# Patient Record
Sex: MALE | Race: BLACK OR AFRICAN AMERICAN | Employment: UNEMPLOYED | URBAN - METROPOLITAN AREA
[De-identification: names, ages, dates, MRNs, and addresses within clinical notes are randomized per-mention and may not be internally consistent; named-entity substitution may affect disease eponyms.]

---

## 2019-03-21 ENCOUNTER — OFFICE VISIT (OUTPATIENT)
Dept: ORTHOPEDIC SURGERY | Age: 54
End: 2019-03-21

## 2019-03-21 VITALS
DIASTOLIC BLOOD PRESSURE: 90 MMHG | TEMPERATURE: 97.7 F | RESPIRATION RATE: 16 BRPM | OXYGEN SATURATION: 97 % | WEIGHT: 250.6 LBS | BODY MASS INDEX: 33.21 KG/M2 | SYSTOLIC BLOOD PRESSURE: 167 MMHG | HEART RATE: 66 BPM | HEIGHT: 73 IN

## 2019-03-21 DIAGNOSIS — M79.18 CERVICAL MYOFASCIAL PAIN SYNDROME: ICD-10-CM

## 2019-03-21 DIAGNOSIS — M54.2 CERVICAL PAIN: ICD-10-CM

## 2019-03-21 DIAGNOSIS — M54.59 MECHANICAL LOW BACK PAIN: ICD-10-CM

## 2019-03-21 DIAGNOSIS — M54.50 LUMBAR SPINE PAIN: Primary | ICD-10-CM

## 2019-03-21 DIAGNOSIS — M79.18 MYOFASCIAL PAIN: ICD-10-CM

## 2019-03-21 NOTE — PROGRESS NOTES
[address]  [address], [address]  [place], [facility]  Phone: [phone]  Fax: [phone]        Jason Clark  : 1965  PCP: Naida Hogan DO  3/21/2019    NEW PATIENT      HISTORY OF PRESENT ILLNESS  Cuauhtemoc Montemayor is a 47 y.o. male c/o chronic neck and low back pain x 2+ years. He reports that he had cervical interlaminar injections in the past in [place] that did not provide any improvement of his pain. He believes he had trigger point injections in the past that provided some relief for his lower back pain. He has trigger fingers in his left hand - he is scheduled to have this evaluated next week. He has h/o DM. He has found some improvement of his low back pain with wearing a girdle for support. He works as a . He believes that he has had a lumbar MRI in the past, but does not have the images or reports with him today. He rates his pain as an 8/10 today. ASSESSMENT  His pain is likely myofascial in nature given his tenderness to palpation of his cervical and lumbar regions. PLAN  1. I advised a Theracane may be beneficial.  2. Thoroughly advised on maintaining an HEP and proper biomechanics. 3. Referred to PT with dry needling. Pt will f/u in 6 weeks or sooner if needed. Diagnoses and all orders for this visit:    1. Lumbar spine pain  -     AMB POC XRAY, SPINE, LUMBOSACRAL; 2 O  -     REFERRAL TO PHYSICAL THERAPY    2. Cervical pain  -     AMB POC XRAY, SPINE, CERVICAL; 2 OR 3  -     REFERRAL TO PHYSICAL THERAPY    3. Mechanical low back pain  -     REFERRAL TO PHYSICAL THERAPY    4. Myofascial pain  -     REFERRAL TO PHYSICAL THERAPY    5. Cervical myofascial pain syndrome  -     REFERRAL TO PHYSICAL THERAPY       CHIEF COMPLAINT  Cuauhtemoc Montemayor is seen today in consultation at the request of Naida Hogan DO for complaints of chronic neck and low back pain.        PAST MEDICAL HISTORY   Past Medical History: Diagnosis Date    Diabetes ([facility] [address]     Diabetes mellitus     Difficulty voiding     Essential hypertension     Hypertension        No past surgical history on file. MEDICATIONS    Current Outpatient Medications   Medication Sig Dispense Refill    metFORMIN (GLUCOPHAGE) 500 mg tablet Take  by mouth two (2) times daily (with meals).  insulin NPH/insulin regular (HUMULIN 70/30 U-100 INSULIN) 100 unit/mL (70-30) injection by SubCUTAneous route.  tamsulosin (FLOMAX) 0.4 mg capsule Take 0.4 mg by mouth daily.  losartan (COZAAR) 100 mg tablet Take 100 mg by mouth daily.  sildenafil, antihypertensive, (REVATIO) 20 mg tablet Take five tablets by mouth one hour prior to sexual activity. 50 Tab 11    LOSARTAN PO Take  by mouth.  amLODIPine (NORVASC) 5 mg tablet Take 5 mg by mouth daily.  DUTASTERIDE (AVODART PO) Take  by mouth.  LINAGLIPTIN/METFORMIN HCL (JENTADUETO PO) Take  by mouth.  tamsulosin (FLOMAX) 0.4 mg capsule Take 1 Cap by mouth daily. 90 Cap 3       ALLERGIES  No Known Allergies       SOCIAL HISTORY    Social History     Socioeconomic History    Marital status:      Spouse name: Not on file    Number of children: Not on file    Years of education: Not on file    Highest education level: Not on file   Tobacco Use    Smoking status: Never Smoker    Smokeless tobacco: Never Used   Substance and Sexual Activity    Alcohol use: Yes   Social History Narrative    ** Merged History Encounter **            FAMILY HISTORY  Family History   Problem Relation Age of Onset    Hypertension Mother     Diabetes Father     Diabetes Mother     Hypertension Father     Diabetes Brother     Hypertension Brother          REVIEW OF SYSTEMS  Review of Systems   Musculoskeletal: Positive for back pain and neck pain.          PHYSICAL EXAMINATION  Visit Vitals  /90   Pulse 66   Temp 97.7 °F (36.5 °C)   Resp 16   Ht 6' 1\" (1.854 m)   Wt 250 lb 9.6 oz (113.7 kg)   SpO2 97%   BMI 33.06 kg/m²         No flowsheet data found. Constitutional:  Well developed, well nourished, in no acute distress. Psychiatric: Affect and mood are appropriate. HEENT: Normocephalic, atraumatic. Extraocular movements intact. Integumentary: No rashes or abrasions noted on exposed areas. Cardiovascular: Regular rate and rhythm. Pulmonary: Clear to auscultation bilaterally. SPINE/MUSCULOSKELETAL EXAM    Cervical spine:  Neck is midline. Normal muscle tone. No focal atrophy is noted. ROM pain free. Shoulder ROM intact. Tenderness to palpation of trapezii and cervical paraspinals. Negative Spurling's sign. Negative Tinel's sign. Negative Krishnamurthy's sign. Sensation in the bilateral arms grossly intact to light touch. Lumbar spine:  No rash, ecchymosis, or gross obliquity. No fasciculations. No focal atrophy is noted. No pain with hip ROM. Full range of motion. Tenderness to palpation of lumbar paraspinals. No tenderness to palpation at the sciatic notch. SI joints non-tender. Trochanters non tender. Sensation in the bilateral legs grossly intact to light touch. MOTOR:      Biceps  Triceps Deltoids Wrist Ext Wrist Flex Hand Intrin   Right 5/5 5/5 5/5 5/5 5/5 5/5   Left 5/5 5/5 5/5 5/5 5/5 5/5             Hip Flex  Quads Hamstrings Ankle DF EHL Ankle PF   Right 5/5 5/5 5/5 5/5 5/5 5/5   Left 5/5 5/5 5/5 5/5 5/5 5/5     DTRs are 1+ biceps, triceps, brachioradialis, patella, and Achilles. Negative Straight Leg raise. Squat not tested. No difficulty with tandem gait. Ambulation without assistive device. FWB. RADIOGRAPHS  Cervical XR images taken on 3/21/19 personally reviewed with patient:  Lean to the right. Facet sclerosis  Anterior endplate osteophytes  Disc space narrowing at C5-6 and C6-7    Lumbar XR images taken on 3/21/19 personally reviewed with patient:  Normal alignment.   No significant facet sclerosis,  Slight lean to the L  No obvious compression fractures or instabilities. Lumbar XR images taken on 5/16/18 personally reviewed with patient:  Findings: Minimal dextrocurvature. Alignment is otherwise normal. No listhesis elicited with flexion/extension. Vertebral body height is preserved. Mild multilevel degenerative disc changes. Facets appear normal. The regional soft tissues are unremarkable. IMPRESSION  IMPRESSION:     Mild degenerative disc changes. No evidence of instability. IMPRESSION:     Mild degenerative disc changes. No evidence of instability.  reviewed    Mr. Tita Cowan has a reminder for a \"due or due soon\" health maintenance. I have asked that he contact his primary care provider for follow-up on this health maintenance. 21 minutes of face-to-face contact were spent with the patient during today's visit extensively discussing symptoms and treatment plan. All questions were answered. More than half of this visit today was spent on counseling. Written by Mari Nieto, as dictated by Dr. Latha Salas. I, Dr. Latha Salas, confirm that all documentation is accurate.

## 2019-03-21 NOTE — PATIENT INSTRUCTIONS
Back Stretches: Exercises  Your Care Instructions  Here are some examples of exercises for stretching your back. Start each exercise slowly. Ease off the exercise if you start to have pain. Your doctor or physical therapist will tell you when you can start these exercises and which ones will work best for you. How to do the exercises  Overhead stretch    1. Stand comfortably with your feet shoulder-width apart. 2. Looking straight ahead, raise both arms over your head and reach toward the ceiling. Do not allow your head to tilt back. 3. Hold for 15 to 30 seconds, then lower your arms to your sides. 4. Repeat 2 to 4 times. Side stretch    1. Stand comfortably with your feet shoulder-width apart. 2. Raise one arm over your head, and then lean to the other side. 3. Slide your hand down your leg as you let the weight of your arm gently stretch your side muscles. Hold for 15 to 30 seconds. 4. Repeat 2 to 4 times on each side. Press-up    1. Lie on your stomach, supporting your body with your forearms. 2. Press your elbows down into the floor to raise your upper back. As you do this, relax your stomach muscles and allow your back to arch without using your back muscles. As your press up, do not let your hips or pelvis come off the floor. 3. Hold for 15 to 30 seconds, then relax. 4. Repeat 2 to 4 times. Relax and rest    1. Lie on your back with a rolled towel under your neck and a pillow under your knees. Extend your arms comfortably to your sides. 2. Relax and breathe normally. 3. Remain in this position for about 10 minutes. 4. If you can, do this 2 or 3 times each day. Follow-up care is a key part of your treatment and safety. Be sure to make and go to all appointments, and call your doctor if you are having problems. It's also a good idea to know your test results and keep a list of the medicines you take. Where can you learn more?   Go to http://inessa-jayda.info/. Enter R864 in the search box to learn more about \"Back Stretches: Exercises. \"  Current as of: September 20, 2018  Content Version: 11.9  © 3683-5503 [employer]. Care instructions adapted under license by [employer] (which disclaims liability or warranty for this information). If you have questions about a medical condition or this instruction, always ask your healthcare professional. [address] any warranty or liability for your use of this information. Therapeutic Ball: Back Exercises  Your Care Instructions  Here are some examples of typical exercises for your condition. Start each exercise slowly. Ease off the exercise if you start to have pain. Your doctor or physical therapist will tell you when you can start these exercises and which ones will work best for you. To prepare, make sure that your ball is the right size for you. When inflated and firm, it should allow you to sit with your hips and knees bent at about a 90-degree angle (like the letter L). How to do the exercises  Seated position on ball    1. Use this exercise to get used to moving on the ball and to find your best sitting position. 2. Sit comfortably on the ball with your feet about hip-width apart. If you feel unsteady, rest your hands on the ball near your hips. 3. As you do this exercise, try to keep your shoulders and upper body relaxed and still. 4. Using your stomach and back muscles to move your pelvis, roll the ball forward. This will round your back. 5. Still using your stomach and back muscles, roll the ball back. You will arch your back. 6. Repeat this rounding-arching motion a few times. 7. Stop in between the two positions, where your back is not rounded or arched. This is called your neutral position. Pelvic rotation    1. Sit tall on the ball. 2. Slowly rotate your hips in a [place] pattern.  Keep the movement focused at your hips. 3. Repeat, but [place] in the other direction. 4. Repeat 8 to 12 times. Postural sitting    1. Use this position to find a stable, relaxed posture on the ball. You can use this position as your starting point for other ball exercises. If you feel unsteady on the ball, start on a chair first.  2. Sit on a ball or chair, with your feet planted straight in front of you. 3. Imagine that a string at the top of your head is pulling you straight up. Think of yourself as 2 inches taller than you are.  4. Slightly tuck your chin. 5. Keep your shoulders back and relaxed. Knee extension    1. Sit tall on the ball with your feet planted in front of you, hip-width apart. As you do this exercise, avoid slumping your shoulders and arching your back. 2. Rest your hands on the ball near your hip or a steady object next to you. (If you feel very stable on the ball, rest your hands in your lap or at your side.)  3. Slowly straighten one leg at the knee. Slowly lower it back down. Repeat with the other leg. 4. Repeat this exercise 8 to 12 times. Roll-ups    1. Lie on your back with your knees bent, feet resting on the floor. 2. Lay the ball on your thighs. Rest your hands up high on the ball. 3. Raising your head and shoulder blades, roll the ball up your thighs. Exhale as you roll up. 4. If this is hard on your neck, gently support your lower head and upper neck with one hand. Don't use that hand to pull your head up. 5. Repeat 8 to 12 times. Ball curls    1. Lie on your back with your ankles resting on the ball, knees straight. 2. Use your legs to roll the exercise ball toward you. Allow your knees to bend and move closer to your chest.  3. Pause briefly, and then roll the ball to the starting position. Try to keep the ball rolling straight. You will feel the muscles in your lower belly working. 4. Repeat 8 to 12 times. Bridge with ball under legs    1.  Lie on your back with your legs up, calves resting on the ball. For more challenge, rest your heels on the ball. 2. Look up at the ceiling, and keep your chin relaxed. You can place a small pillow under your head or neck for comfort. 3. With your arms by your side, press your hands onto the floor for stability. 4. Tighten your belly muscles by pulling in your belly button toward your spine. 5. Push your heels down toward the floor, squeeze your buttocks, and lift your hips off the floor until your shoulders, hips, and knees are all in a straight line. 6. Try to keep the ball steady. Hold for about 6 seconds as you continue to breathe normally. 7. Slowly lower your hips back down to the floor. 8. Repeat 8 to 12 times. Ball curls with bridge    1. Start flat on your back with your ankles resting on the ball. 2. Look up at the ceiling, and keep your chin relaxed. You can place a small pillow under your head or neck for comfort. 3. With your arms by your side, press your hands onto the floor for stability. 4. Tighten your belly muscles by pulling in your belly button toward your spine. 5. Push your heels down toward the floor, squeeze your buttocks, and lift your hips off the floor until your shoulders, hips, and knees are all in a straight line. 6. While holding the bridge position, roll the ball toward you with your heels. Keep your hips as level as you can. 7. Pause briefly, and then roll the ball back out. Try to keep the ball rolling straight. You will feel the muscles in your lower belly working as you straighten your legs. 8. Lower your hips, and return to your starting position. 9. Repeat 8 to 12 times. 10. When you can keep your body and the ball steady throughout this exercise, you're ready for more challenge. Try keeping your hips raised while rolling the ball out, holding the bridge, and rolling back, a few times in a row. Praying chino    1. Kneel upright with the ball in front of you.   2. To start, clasp your hands together. Rest them on the ball in front of you. 3. As you do this exercise, keep your back and hips straight and tighten your belly and buttocks muscles. Keep your knees in place. 4. Press on the ball with your arms. Lean forward from the knees. This rolls the ball forward. You will bear most of your weight on your arms. 5. If your back starts to ache, you've gone too far. Pull back a bit. 6. Roll back to the start position. 7. Repeat 8 to 12 times. Walk-out plank on ball    1. Kneel over the ball. Place your hands on the floor in front of you. 2. Walk your hands forward until your legs are straight on the ball. This is the plank position. 3. When in plank position, hold your body straight and tighten your belly and buttocks muscles. Keep your chin slightly tucked. 4. Roll as far forward as you can without losing your balance or letting your hips drop. You may stop with the ball under your thighs, or even under your knees or shins. 5. Hold a few seconds, then walk your hands back and return to the start position. 6. Repeat 8 to 12 times. Push-up with thighs on ball    1. Kneel over the ball. Place your hands on the floor in front of you. 2. Walk your hands forward until your legs are straight on the ball. This is the plank position. 3. When in plank position, hold your body straight and tighten your belly and buttocks muscles. Keep your chin slightly tucked. 4. Roll as far forward as you can without losing your balance or letting your hips drop. You may stop with the ball under your thighs, or even under your knees or shins. 5. Bend your elbows. Slowly lower your body toward the ground as far as you can without losing your balance. 6. If your wrists hurt, try moving your hands a little farther apart so they're not right under your shoulders. 7. Slowly straighten your arms. 8. Do 8 to 12 of these push-ups. Wall squat with ball    1. Stand facing away from a wall.  Place your feet about shoulder-width apart. 2. Place the ball between your middle back and the wall. Move your feet out in front of you so they are about a foot in front of your hips. 3. Keep your arms at your sides, or put your hands on your hips. 4. Slowly squat down as if you are going to sit in a chair, rolling your back over the ball as you squat. The ball should move with you but stay pressed into the wall. 5. Be sure that your knees do not go in front of your toes as you squat. 6. Hold for 6 seconds. 7. Slowly rise to your standing position. 8. Repeat 8 to 12 times. Child's pose with ball    1. Kneeling upright with your back straight, rest your hands on the ball in front of you. 2. Breathe out as you bend at the hips, and roll the ball forward. Lower your chest toward the ground, and drop your hips back toward your heels. 3. To stretch your upper back and shoulders, hold this position for 15 to 30 seconds. 4. Repeat 2 to 4 times. Follow-up care is a key part of your treatment and safety. Be sure to make and go to all appointments, and call your doctor if you are having problems. It's also a good idea to know your test results and keep a list of the medicines you take. Where can you learn more? Go to http://inessa-jayda.info/. Enter G795 in the search box to learn more about \"Therapeutic Ball: Back Exercises. \"  Current as of: September 20, 2018  Content Version: 11.9  © 0855-6886 [employer]. Care instructions adapted under license by [employer] (which disclaims liability or warranty for this information). If you have questions about a medical condition or this instruction, always ask your healthcare professional. [address] any warranty or liability for your use of this information. Low Back Pain: Exercises  Your Care Instructions  Here are some examples of typical rehabilitation exercises for your condition. Start each exercise slowly. Ease off the exercise if you start to have pain. Your doctor or physical therapist will tell you when you can start these exercises and which ones will work best for you. How to do the exercises  Press-up    1. Lie on your stomach, supporting your body with your forearms. 2. Press your elbows down into the floor to raise your upper back. As you do this, relax your stomach muscles and allow your back to arch without using your back muscles. As your press up, do not let your hips or pelvis come off the floor. 3. Hold for 15 to 30 seconds, then relax. 4. Repeat 2 to 4 times. Alternate arm and leg (bird dog) exercise    1. Start on the floor, on your hands and knees. 2. Tighten your belly muscles. 3. Raise one leg off the floor, and hold it straight out behind you. Be careful not to let your hip drop down, because that will twist your trunk. 4. Hold for about 6 seconds, then lower your leg and switch to the other leg. 5. Repeat 8 to 12 times on each leg. 6. Over time, work up to holding for 10 to 30 seconds each time. 7. If you feel stable and secure with your leg raised, try raising the opposite arm straight out in front of you at the same time. Knee-to-chest exercise    1. Lie on your back with your knees bent and your feet flat on the floor. 2. Bring one knee to your chest, keeping the other foot flat on the floor (or keeping the other leg straight, whichever feels better on your lower back). 3. Keep your lower back pressed to the floor. Hold for at least 15 to 30 seconds. 4. Relax, and lower the knee to the starting position. 5. Repeat with the other leg. Repeat 2 to 4 times with each leg. 6. To get more stretch, put your other leg flat on the floor while pulling your knee to your chest.    Curl-ups    1. Lie on the floor on your back with your knees bent at a 90-degree angle. Your feet should be flat on the floor, about 12 inches from your buttocks.   2. Cross your arms over your chest. If this bothers your neck, try putting your hands behind your neck (not your head), with your elbows spread apart. 3. Slowly tighten your belly muscles and raise your shoulder blades off the floor. 4. Keep your head in line with your body, and do not press your chin to your chest.  5. Hold this position for 1 or 2 seconds, then slowly lower yourself back down to the floor. 6. Repeat 8 to 12 times. Pelvic tilt exercise    1. Lie on your back with your knees bent. 2. \"Brace\" your stomach. This means to tighten your muscles by pulling in and imagining your belly button moving toward your spine. You should feel like your back is pressing to the floor and your hips and pelvis are rocking back. 3. Hold for about 6 seconds while you breathe smoothly. 4. Repeat 8 to 12 times. Heel dig bridging    1. Lie on your back with both knees bent and your ankles bent so that only your heels are digging into the floor. Your knees should be bent about 90 degrees. 2. Then push your heels into the floor, squeeze your buttocks, and lift your hips off the floor until your shoulders, hips, and knees are all in a straight line. 3. Hold for about 6 seconds as you continue to breathe normally, and then slowly lower your hips back down to the floor and rest for up to 10 seconds. 4. Do 8 to 12 repetitions. Hamstring stretch in doorway    1. Lie on your back in a doorway, with one leg through the open door. 2. Slide your leg up the wall to straighten your knee. You should feel a gentle stretch down the back of your leg. 3. Hold the stretch for at least 15 to 30 seconds. Do not arch your back, point your toes, or bend either knee. Keep one heel touching the floor and the other heel touching the wall. 4. Repeat with your other leg. 5. Do 2 to 4 times for each leg. Hip flexor stretch    1. Kneel on the floor with one knee bent and one leg behind you. Place your forward knee over your foot.  Keep your other knee touching the floor.  2. Slowly push your hips forward until you feel a stretch in the upper thigh of your rear leg. 3. Hold the stretch for at least 15 to 30 seconds. Repeat with your other leg. 4. Do 2 to 4 times on each side. Wall sit    1. Stand with your back 10 to 12 inches away from a wall. 2. Lean into the wall until your back is flat against it. 3. Slowly slide down until your knees are slightly bent, pressing your lower back into the wall. 4. Hold for about 6 seconds, then slide back up the wall. 5. Repeat 8 to 12 times. Follow-up care is a key part of your treatment and safety. Be sure to make and go to all appointments, and call your doctor if you are having problems. It's also a good idea to know your test results and keep a list of the medicines you take. Where can you learn more? Go to http://inessa-jayda.info/. Enter W901 in the search box to learn more about \"Low Back Pain: Exercises. \"  Current as of: September 20, 2018  Content Version: 11.9  © 5494-3391 [employer]. Care instructions adapted under license by [employer] (which disclaims liability or warranty for this information). If you have questions about a medical condition or this instruction, always ask your healthcare professional. [address] any warranty or liability for your use of this information. Shoulder Blade: Exercises  Your Care Instructions  Here are some examples of typical exercises for your condition. Start each exercise slowly. Ease off the exercise if you start to have pain. Your doctor or physical therapist will tell you when you can start these exercises and which ones will work best for you. How to do the exercises  Shoulder roll    1. Stand tall with your chin slightly tucked. Imagine that a string at the top of your head is pulling you straight up. 2. Keep your arms relaxed. All motion will be in your shoulders.   3. Shrug your shoulders up toward your ears, then up and back. [place] your shoulders down and back, like you're sliding your hands down into your back pants pockets. 4. Repeat the circles at least 2 to 4 times. 5. This exercise is also helpful anytime you want to relax. Lower neck and upper back stretch    1. With your arms about shoulder height, clasp your hands in front of you. 2. Drop your chin toward your chest.  3. Reach straight forward so you are rounding your upper back. Think about pulling your shoulder blades apart. [place] feel a stretch across your upper back and shoulders. Hold for at least 6 seconds. 4. Repeat 2 to 4 times. Triceps stretch    1. Reach your arm straight up. 2. Keeping your elbow in place, bend your arm and reach your hand down behind your back. 3. With your other hand, apply gentle pressure to the bent elbow. [place] feel a stretch at the back of your upper arm and shoulder. Hold about 6 seconds. 4. Repeat 2 to 4 times with each arm. Shoulder stretch    1. Relax your shoulders. 2. Raise one arm to shoulder height, and reach it across your chest.  3. Pull the arm slightly toward you with your other arm. This will help you get a gentle stretch. Hold for about 6 seconds. 4. Repeat 2 to 4 times. Shoulder blade squeeze    1. Sit or stand up tall with your arms at your sides. 2. Keep your shoulders relaxed and down, not shrugged. 3. Squeeze your shoulder blades together. Hold for 6 seconds, then relax. 4. Repeat 8 to 12 times. Straight-arm shoulder blade squeeze    1. Sit or stand tall. Relax your shoulders. 2. With palms down, hold your elastic tubing or band straight out in front of you. 3. Start with slight tension in the tubing or band, with your hands about shoulder-width apart. 4. Slowly pull straight out to the sides, squeezing your shoulder blades together. Keep your arms straight and at shoulder height. Slowly release. 5. Repeat 8 to 12 times. Rowing    1.  [place] your elastic tubing or band at about waist height. Take one end in each hand. 2. Sit or stand with your feet hip-width apart. 3. Hold your arms straight in front of you. Adjust your distance to create slight tension in the tubing or band. 4. Slightly tuck your chin. Relax your shoulders. 5. Without shrugging your shoulders, pull straight back. Your elbows will pass alongside your waist.    Pull-downs    1. [place] your elastic tubing or band in the top of a closed door. Take one end in each hand. 2. Either sit or stand, depending on what is more comfortable. If you feel unsteady, sit on a chair. 3. Start with your arms up and comfortably apart, elbows straight. There should be a slight tension in the tubing or band. 4. Slightly tuck your chin, and look straight ahead. 5. Keeping your back straight, slowly pull down and back, bending your elbows. 6. Stop where your hands are level with your chin, in a \"goalpost\" position. 7. Repeat 8 to 12 times. Chest T stretch    1. Lie on your back. Raise your knees so they are bent. Plant your feet on the floor, hip-width apart. 2. Tuck your chin, and relax your shoulders. 3. Reach your arms straight out to the sides. If you don't feel a mild stretch in your shoulders and across your chest, use a foam roll or a tightly rolled blanket under your spine, from your tailbone to your head. 4. Relax in this position for at least 15 to 30 seconds while you breathe normally. Repeat 2 to 4 times. 5. As you get used to this stretch, keep adding a little more time until you are able relax in this position for 2 or 3 minutes. When you can relax for at least 2 minutes, you only need to do the exercise 1 time per session. Chest goalpost stretch    1. Lie on your back. Raise your knees so they are bent. Plant your feet on the floor, hip-width apart. 2. Tuck your chin, and relax your shoulders. 3. Reach your arms straight out to the sides.   4. Bend your arms at the elbows, with your hands pointed toward the top of your head. Your arms should make an L on either side of your head. Your palms should be facing up. 5. If you don't feel a mild stretch in your shoulders and across your chest, use a foam roll or tightly rolled blanket under your spine, from your tailbone to your head. 6. Relax in this position for at least 15 to 30 seconds while you breathe normally. Repeat 2 to 4 times. 7. Each day you do this exercise, add a little more time until you can relax in this position for 2 or 3 minutes. When you can relax for at least 2 minutes, you only need to do the exercise 1 time per session. Follow-up care is a key part of your treatment and safety. Be sure to make and go to all appointments, and call your doctor if you are having problems. It's also a good idea to know your test results and keep a list of the medicines you take. Where can you learn more? Go to http://inessa-jayda.info/. Enter ([phone] 7357 0931 in the search box to learn more about \"Shoulder Blade: Exercises. \"  Current as of: September 20, 2018  Content Version: 11.9  © 7575-2057 [employer]. Care instructions adapted under license by [employer] (which disclaims liability or warranty for this information). If you have questions about a medical condition or this instruction, always ask your healthcare professional. [place] [address] any warranty or liability for your use of this information. Neck: Exercises  Your Care Instructions  Here are some examples of typical rehabilitation exercises for your condition. Start each exercise slowly. Ease off the exercise if you start to have pain. Your doctor or physical therapist will tell you when you can start these exercises and which ones will work best for you. How to do the exercises  Neck stretch    5. This stretch works best if you keep your shoulder down as you lean away from it.  To help you remember to do this, start by relaxing your shoulders and lightly holding on to your thighs or your chair. 6. Tilt your head toward your shoulder and hold for 15 to 30 seconds. Let the weight of your head stretch your muscles. 7. If you would like a little added stretch, use your hand to gently and steadily pull your head toward your shoulder. For example, keeping your right shoulder down, lean your head to the left. 8. Repeat 2 to 4 times toward each shoulder. Diagonal neck stretch    5. Turn your head slightly toward the direction you will be stretching, and tilt your head diagonally toward your chest and hold for 15 to 30 seconds. 6. If you would like a little added stretch, use your hand to gently and steadily pull your head forward on the diagonal.  7. Repeat 2 to 4 times toward each side. Dorsal glide stretch    5. Sit or stand tall and look straight ahead. 6. Slowly tuck your chin as you glide your head backward over your body  7. Hold for a count of 6, and then relax for up to 10 seconds. 8. Repeat 8 to 12 times. Chest and shoulder stretch    5. Sit or stand tall and glide your head backward as in the dorsal glide stretch. 6. Raise both arms so that your hands are next to your ears. 7. Take a deep breath, and as you breathe out, lower your elbows down and behind your back. You will feel your shoulder blades slide down and together, and at the same time you will feel a stretch across your chest and the front of your shoulders. 8. Hold for about 6 seconds, and then relax for up to 10 seconds. 9. Repeat 8 to 12 times. Strengthening: Hands on head    1. Move your head backward, forward, and side to side against gentle pressure from your hands, holding each position for about 6 seconds. 2. Repeat 8 to 12 times. Follow-up care is a key part of your treatment and safety. Be sure to make and go to all appointments, and call your doctor if you are having problems.  It's also a good idea to know your test results and keep a list of the medicines you take. Where can you learn more? Go to http://inessa-jayda.info/. Enter P975 in the search box to learn more about \"Neck: Exercises. \"  Current as of: September 20, 2018  Content Version: 11.9  © 4234-8144 [employer]. Care instructions adapted under license by [employer] (which disclaims liability or warranty for this information). If you have questions about a medical condition or this instruction, always ask your healthcare professional. [place] [address] any warranty or liability for your use of this information. Healthy Upper Back: Exercises  Your Care Instructions  Here are some examples of exercises for your upper back. Start each exercise slowly. Ease off the exercise if you start to have pain. Your doctor or physical therapist will tell you when you can start these exercises and which ones will work best for you. How to do the exercises  Lower neck and upper back stretch    9. Stretch your arms out in front of your body. Clasp one hand on top of your other hand. 10. Gently reach out so that you feel your shoulder blades stretching away from each other. 11. Gently bend your head forward. 12. Hold for 15 to 30 seconds. 13. Repeat 2 to 4 times. Midback stretch    8. Kneel on the floor, and sit back on your ankles. 9. Lean forward, place your hands on the floor, and stretch your arms out in front of you. Rest your head between your arms. 10. Gently push your chest toward the floor, reaching as far in front of you as possible. 11. Hold for 15 to 30 seconds. 12. Repeat 2 to 4 times. Shoulder rolls    9. Sit comfortably with your feet shoulder-width apart. You can also do this exercise while standing. 10. Roll your shoulders up, then back, and then down in a smooth, circular motion. 11. Repeat 2 to 4 times. Wall push-up    10.  Stand against a wall with your feet about 12 to 24 inches back from the wall. If you feel any pain when you do this exercise, stand closer to the wall. 11. Place your hands on the wall slightly wider apart than your shoulders, and lean forward. 12. Gently lean your body toward the wall. Then push back to your starting position. Keep the motion smooth and controlled. 13. Repeat 8 to 12 times. Resisted shoulder blade squeeze    3. Sit or stand, holding the band in both hands in front of you. Keep your elbows close to your sides, bent at a 90-degree angle. Your palms should face up. 4. Squeeze your shoulder blades together, and move your arms to the outside, stretching the band. Be sure to keep your elbows at your sides while you do this. 5. Relax. 6. Repeat 8 to 12 times. Resisted rows    1. Put the band around a solid object, such as a bedpost, at about waist level. Hold one end of the band in each hand. 2. With your elbows at your sides and bent to 90 degrees, pull the band back to move your shoulder blades toward each other. Return to the starting position. 3. Repeat 8 to 12 times. Follow-up care is a key part of your treatment and safety. Be sure to make and go to all appointments, and call your doctor if you are having problems. It's also a good idea to know your test results and keep a list of the medicines you take. Where can you learn more? Go to http://inessa-jayda.info/. Enter M772 in the search box to learn more about \"Healthy Upper Back: Exercises. \"  Current as of: September 20, 2018  Content Version: 11.9  © 7568-4064 [employer]. Care instructions adapted under license by [employer] (which disclaims liability or warranty for this information). If you have questions about a medical condition or this instruction, always ask your healthcare professional. [address] any warranty or liability for your use of this information.

## 2019-04-04 ENCOUNTER — HOSPITAL ENCOUNTER (OUTPATIENT)
Dept: PHYSICAL THERAPY | Age: 54
Discharge: HOME | End: 2019-04-04
Payer: MEDICAID

## 2019-04-04 PROCEDURE — 97110 THERAPEUTIC EXERCISES: CPT

## 2019-04-04 PROCEDURE — 97161 PT EVAL LOW COMPLEX 20 MIN: CPT

## 2019-04-04 NOTE — PROGRESS NOTES
PT DAILY TREATMENT NOTE/LUMBAR EVAL 10-18 Patient Name: Mena Willard 
Date:2019 : 1965 [x]  Patient  Verified Payor: [place] MED[place] / Plan: Hugo Mcintosh y / Product Type: Medicare / In time:3:39pm  Out time:4:20pm 
Total Treatment Time (min): 41 Visit #: 1 of 8 Medicare/BCBS Only Total Timed Codes (min):  21 1:1 Treatment Time:  41  
 
Treatment Area: Low back pain [M54.5] Cervicalgia [M54.2] SUBJECTIVE Pain Level (0-10 scale): 5/10 [x]constant []intermittent []improving [x]worsening []no change since onset Any medication changes, allergies to medications, adverse drug reactions, diagnosis change, or new procedure performed?: [x] No    [] Yes (see summary sheet for update) Subjective functional status/changes:    
Pt is a 47year old male who reports 10 years of chronic back and neck pain void of acute precipitating incident. He reports pain is constant, aching, and tends to worsen with prolonged sitting or standing. He denies bowel and bladder symptoms, numbness, tingling, weakness. He reports he has had some injection in his neck and back with limited relief. PLOF: back pain and neck pain many years, some limited activity tolerance 2/2 pain Limitations to PLOF: similar, recently began exercising again per pt report Mechanism of Injury: chronic Current symptoms/Complaints: see above Previous Treatment/Compliance: Injections with limited relief, PT for a few visits without relief PMHx/Surgical Hx: left hand trigger finger, DM, chronic neck and back pain Work Hx:  Living Situation:  
Pt Goals: see intake Barriers: []pain []financial [x]time []transportation []other Motivation: appears motivated, cooperative Substance use: []Alcohol []Tobacco []other:  
FABQ Score: []low []elevate Cognition: A & O x 4    Other: OBJECTIVE/EXAMINATION Domestic Life:  
Activity/Recreational Limitations:  
Mobility:  
Self Care: 14 min Therapeutic Exercise:  [] See flow sheet : HEP creation and instruction per handout Rationale: increase ROM and increase strength to improve the patients ability to improve ease of daily activity. Eval: 20 minutes Self care: 7 minutes pt education regarding relevant anatomy, diagnosis, prognosis, plan of care, self modality use to facilitate pt self management. With 
 [] TE 
 [] TA 
 [] neuro 
 [] other: Patient Education: [x] Review HEP [] Progressed/Changed HEP based on:  
[] positioning   [] body mechanics   [] transfers   [] heat/ice application   
[] other:   
 
Other Objective/Functional Measures:  
 
Physical Therapy Evaluation - Lumbar Spine ([facility]) SUBJECTIVE Chief Complaint: 
 
Mechanism of injury: 
 
Symptoms: 
Aggravated by: 
 [] Bending [x] Sitting [x] Standing [x] Walking 
 [x] Moving [] Cough [] Sneeze [] Valsalva 
 [] AM  [] PM  Lying:  [x] sup   [] pro   [] sidelying 
 [] Other: 
  
Eased by:  
 [] Bending [] Sitting [] Standing [] Walking 
 [] Moving [] AM  [] PM  Lying: [] sup  [] pro  [x] sidelying 
 [] Other: 
  
General Health:  Red Flags Indicated? [] Yes    [x] No 
[] Yes [] No Recent weight change (If yes, due to dieting? [] Yes  [] No) [] Yes [] No Weakness in legs during walking 
[] Yes [] No Unremitting pain at night 
[] Yes [] No Abdominal pain or problems 
[] Yes [] No Rectal bleeding 
[] Yes [] No Feet more cold or painful in cold weather 
[] Yes [] No Menstrual irregularities 
[] Yes [] No Blood or pain with urination 
[] Yes [] No Dysfunction of bowel or bladder 
[] Yes [] No Recent illness within past 3 weeks (i.e, cold, flu) 
[] Yes [] No Numbness/tingling in buttock/genitalia region Past History/Treatments:  
 
Diagnostic Tests: [] Lab work [x] X-rays    [] CT [] MRI     [] Other: 
Results:  
\"RADIOGRAPHS Cervical XR images taken on 3/21/19 personally reviewed with patient: 
Lean to the right. Facet sclerosis Anterior endplate osteophytes Disc space narrowing at C5-6 and C6-7 
  
Lumbar XR images taken on 3/21/19 personally reviewed with patient: 
Normal alignment. No significant facet sclerosis, Slight lean to the L No obvious compression fractures or instabilities. Lumbar XR images taken on 5/16/18 personally reviewed with patient: 
Findings: Minimal dextrocurvature. Alignment is otherwise normal. No listhesis elicited with flexion/extension. Vertebral body height is preserved. Mild multilevel degenerative disc changes. Facets appear normal. The regional soft tissues are unremarkable. \" 
 
Functional Status Prior level of function: 
Present functional limitations: 
What position do you sleep in?: 
 
OBJECTIVEPosture: 
Lateral Shift: [] R    [] L     [] +  [] - 
Kyphosis: [] Increased [] Decreased   []  WNL Lordosis:  [x] Increased [] Decreased   [] WNL Pelvic symmetry: [x] WNL    [] Other: Forward shoulder posture Gait:  [x] Normal     [] Abnormal: 
 
Active Movements: [] N/A   [] Too acute   [] Other: ROM % AROM % PROM Comments:pain, area Forward flexion 40-60 Fingers to toes  Discomfort in lower back Extension 20-30 75%  Pressure in lower back SB right 20-30 75%  bilat lower back discomfort SB left 20-30 75%  bilat lower back discomfort Rotation right 5-10 75%  Left lower back tension and pain Rotation left 5-10 75%  Right lower back tension and pain C/S AROM: rotation 60 degrees bilat, ext WNL, flex WNL, SB 28 right 26 left, left cervical discomfort with bilat rotation and left SB reported Repeated Movements Effects on present pain: produces (AK), abolishes (A), increases (incr), decreases (decr), centralizes (C), peripheral (PH), no effect (NE) Pre-Test Sx Flexion Repeated Flexion Extension Repeated Extension Repeated SBL Repeated SBR Sitting Standing Lying      N/A N/A Comments: 
Side Glide: 
Sustained passive positioning test: 
 
Neuro Screen [x] WNL Myotome/Dermatome/Reflexes: Comments: 
 
Dural Mobility: SLR Sitting: [] R    [] L    [] +    [] -  @ (degrees):  
        Supine: [] R    [] L    [] +    [x] -  @ (degrees):  
Slump Test: [] R    [] L    [] +    [x] -  @ (degrees): Prone Knee Bend: [] R    [] L    [] +    [x] - Palpation 
[x] Min  [] Mod  [] Severe    Location: lower lumbar , diffuse TTP B cervical paraspinals [] Min  [] Mod  [] Severe    Location: 
[] Min  [] Mod  [] Severe    Location: 
 
Strength 
 L(0-5) R (0-5) N/T Hip Flexion (L1,2) 5 5 [] Knee Extension (L3,4) 5 5 [] Ankle Dorsiflexion (L4) 5 5 [] Great Toe Extension (L5) 5 5 [] Ankle Plantarflexion (S1) 5 5 [] Knee Flexion (S1,2) 5 5 [] Upper Abdominals   [] Lower Abdominals   [] Paraspinals   [] Back Rotators   [] Gluteus Corky 4 4 [] Other   [] Special Tests Lumbar:  Lumb. Compression: [] Pos  [x] Neg            
  Lumbar Distraction:   [] Pos  [x] Neg 
  Quadrant:  [] Pos  [x] Neg   [] Flex  [] Ext Sacroilliac:  Gaenslen's: [] R    [] L    [] +    [] -  
  Compression: [] +    [x] - Gapping:  [] +    [x] - Thigh Thrust: [] R    [] L    [] +    [] - Leg Length: [] +    [] -   Position: 
  Crests: 
  ASIS: 
  PSIS: 
  Sacral Sulcus: 
  Mobility: Standing flex: 
   Sitting flex: 
   Supine to sit: 
   Prone knee bend: 
 
     Hip: [place]:  [x] R    [x] L    [x] +    [] - -35 left, -38 right Scour:  [] R    [] L    [] +    [x] - Piriformis: [] R    [x] L    [] +    [] - Deficits: Kris's: [] R    [] L    [] +    [x] - Neal: [x] R    [x] L    [x] +    [] - Hamstrings 90/90: -52 right, -47 left Gastrocsoleus (to neutral): Right: Left: 
    
Global Muscular Weakness: 
Abdominals: 
Quadratus Lumborum: 
Paraspinals: Other:  
 
Other tests/comments: 
Hip flexion soft end feel 110 degrees bilat, possibly limited 2/2 clothing (-) spurling's bilat 
(+) pectoral tightness bilat Pain Level (0-10 scale) post treatment: 5 ASSESSMENT/Changes in Function: Per POC Patient will continue to benefit from skilled PT services to modify and progress therapeutic interventions, address functional mobility deficits, address ROM deficits, address strength deficits, analyze and address soft tissue restrictions, analyze and cue movement patterns and instruct in home and community integration to attain remaining goals. [x]  See Plan of Care 
[]  See progress note/recertification 
[]  See Discharge Summary Progress towards goals / Updated goals: 
Per POC. PLAN [x]  Upgrade activities as tolerated     [x]  Continue plan of care 
[]  Update interventions per flow sheet      
[]  Discharge due to:_ 
[x]  Other:_hip mobility, trunk mobility, post chain strength, HS flexibility Coby Angel , PT, DPT, ATC 4/4/2019  3:44 PM

## 2019-04-17 ENCOUNTER — APPOINTMENT (OUTPATIENT)
Dept: PHYSICAL THERAPY | Age: 54
End: 2019-04-17
Payer: MEDICAID

## 2019-04-23 ENCOUNTER — APPOINTMENT (OUTPATIENT)
Dept: PHYSICAL THERAPY | Age: 54
End: 2019-04-23
Payer: MEDICAID

## 2019-04-25 ENCOUNTER — APPOINTMENT (OUTPATIENT)
Dept: PHYSICAL THERAPY | Age: 54
End: 2019-04-25
Payer: MEDICAID

## 2019-04-26 NOTE — PROGRESS NOTES
In [address] [place] [address] [place], [address] 
[phone] fax Physical Therapy Discharge Summary Patient name: Michael Love Start of Care: 2019 Referral source: Colleen Tenorio MD : 1965 Medical Diagnosis: Low back pain [M54.5] Cervicalgia [M54.2] Payor: [place] / Plan: [address] [place] [address] / Product Type: Medicare /  Onset Date: 8 + years Treatment Diagnosis: mechanical neck and back pain Prior Hospitalization: see medical history Provider#: [id number] Medications: Verified on Patient summary List  
 Comorbidities: left hand trigger finger, DM, chronic neck and back pain Visits from Start of Care: 1    Missed Visits: 2 Reporting Period : 2019 to 2019 Summary of Care: 
Goal: Patient will report performance of HEP at least 2 times per day to facilitate improved outcomes and improved self management. Status at last note/certification: unable to reassess Status at discharge: not met Goal: Patient will report FOTO score of 59 or better to indicate significant improvement in functional status. Status at last note/certification: unable to reassess Status at discharge: not met Goal: Pt will demonstrate (-) teodora test bilat to improve lumbopelvic mechanics and reduce pain. Status at last note/certification: unable to reassess Status at discharge: not met Goal: Pt will demonstrate ZAHRA of 15 degrees from parallel to improve lumbopelvic mechanics and reduce pain. Status at last note/certification: unable to reassess Status at discharge: not met Goal: Pt will demonstrate B HS 90/90 flexibility of -35 or better to reduce stress on L/S and improve ease of daily activity. Status at last note/certification: unable to reassess Status at discharge: not met Goal: Pt will demonstrate full cervical AROM void of pain to improve ease of daily activity. Status at last note/certification: unable to reassess Status at discharge: not met ASSESSMENT/RECOMMENDATIONS: 
Pt was seen for the initial evaluation. Per telephone log, on 4/17/2019, the pt cancelled his therapy appointment stating he was \"feeling better\" and self d/c'ed from therapy. Pt is therefore d/c'ed from therapy and unable to reassess goals at this time. [x]Discontinue therapy: []Patient has reached or is progressing toward set goals [x]Patient is non-compliant or has abdicated 
    []Due to lack of appreciable progress towards set goals Greg Rivas, PT 4/26/2019 4:08 PM

## 2019-04-29 ENCOUNTER — APPOINTMENT (OUTPATIENT)
Dept: PHYSICAL THERAPY | Age: 54
End: 2019-04-29
Payer: MEDICAID

## 2020-03-16 PROBLEM — E11.69 ERECTILE DYSFUNCTION ASSOCIATED WITH TYPE 2 DIABETES MELLITUS (HCC): Status: ACTIVE | Noted: 2020-03-16

## 2020-03-16 PROBLEM — R36.1 HEMATOSPERMIA: Status: ACTIVE | Noted: 2020-03-16

## 2020-03-16 PROBLEM — R39.14 FEELING OF INCOMPLETE BLADDER EMPTYING: Status: ACTIVE | Noted: 2020-03-16

## 2020-03-16 PROBLEM — N40.1 ENLARGED PROSTATE WITH LOWER URINARY TRACT SYMPTOMS (LUTS): Status: ACTIVE | Noted: 2020-03-16

## 2020-03-16 PROBLEM — N52.1 ERECTILE DYSFUNCTION ASSOCIATED WITH TYPE 2 DIABETES MELLITUS (HCC): Status: ACTIVE | Noted: 2020-03-16

## 2020-03-16 PROBLEM — R35.1 NOCTURIA: Status: ACTIVE | Noted: 2020-03-16

## 2020-03-16 PROBLEM — R97.20 ELEVATED PSA: Status: ACTIVE | Noted: 2020-03-16

## 2021-10-21 ENCOUNTER — VIRTUAL VISIT (OUTPATIENT)
Dept: ORTHOPEDIC SURGERY | Age: 56
End: 2021-10-21
Payer: MEDICAID

## 2021-10-21 DIAGNOSIS — M25.511 RIGHT SHOULDER PAIN, UNSPECIFIED CHRONICITY: ICD-10-CM

## 2021-10-21 DIAGNOSIS — R20.0 NUMBNESS AND TINGLING IN RIGHT HAND: Primary | ICD-10-CM

## 2021-10-21 DIAGNOSIS — R20.2 NUMBNESS AND TINGLING IN RIGHT HAND: Primary | ICD-10-CM

## 2021-10-21 DIAGNOSIS — M79.18 CERVICAL MYOFASCIAL PAIN SYNDROME: ICD-10-CM

## 2021-10-21 PROCEDURE — 99213 OFFICE O/P EST LOW 20 MIN: CPT | Performed by: PHYSICAL MEDICINE & REHABILITATION

## 2021-10-21 RX ORDER — PREDNISONE 10 MG/1
TABLET ORAL
Qty: 21 TABLET | Refills: 0 | Status: SHIPPED | OUTPATIENT
Start: 2021-10-21

## 2021-10-21 RX ORDER — GABAPENTIN 300 MG/1
300 CAPSULE ORAL 3 TIMES DAILY
Qty: 90 CAPSULE | Refills: 2 | Status: SHIPPED | OUTPATIENT
Start: 2021-10-21

## 2021-10-21 NOTE — PATIENT INSTRUCTIONS
Gabapentin (Neurontin) 300 mg three times a day (TID) daily instructions:       Morning Afternoon Night   Week 1 - - 1 pill   Week 2 1 pill - 1 pill   Week 3 and onwards 1 pill 1 pill 1 pill     Week 1: Take one pill each night. Week 2: Take one pill in the morning and one pill at night. Week 3 and onwards: Take one pill in the morning, one pill in the afternoon, and one pill at night. Continue taking this dose each day. Electromyogram (EMG) and Nerve Conduction Studies: About These Tests  What are they? An electromyogram (EMG) measures the electrical activity of your muscles when you are not using them (at rest) and when you tighten them (muscle contraction). Nerve conduction studies (NCS) measure how well and how fast the nerves can send electrical signals. EMG and nerve conduction studies are often done together. If they are done together, the nerve conduction studies are done before the EMG. Why are they done? You may need an EMG to find diseases that damage your muscles or nerves or to find why you can't move your muscles (paralysis), why they feel weak, or why they twitch. These problems may include a herniated disc, amyotrophic lateral sclerosis (ALS), or myasthenia gravis (MG). You may need nerve conduction studies to find damage to the nerves that lead from the brain and spinal cord to the rest of the body. (This is called the peripheral nervous system.) These studies are often used to help find nerve disorders, such as carpal tunnel syndrome. How do you prepare for these tests?    · Wear loose-fitting clothing. You may be given a hospital gown to wear.     · The electrodes for the test are attached to your skin. Your skin needs to be clean and free of sprays, oils, creams, and lotions.     · You may be asked to sign a consent form that says you understand the risks of the test and agree to have it done.     · Tell your doctor ALL the medicines, vitamins, supplements, and herbal remedies you take. Some may increase the risk of problems during your test. Your doctor will tell you if you should stop taking any of them before the test and how soon to do it.     · If you take aspirin or some other blood thinner, ask your doctor if you should stop taking it before your test. Make sure that you understand exactly what your doctor wants you to do. These medicines increase the risk of bleeding. How are the tests done? You lie on a table or bed or sit in a reclining chair so your muscles are relaxed. For an EMG:   · Your doctor will insert a needle electrode into a muscle. This will record the electrical activity while the muscle is at rest.  · Your doctor will ask you to tighten the same muscle slowly and steadily while the electrical activity is recorded. · Your doctor may move the electrode to a different area of the muscle or a different muscle. For nerve conduction studies:   · Your doctor will attach two types of electrodes to your skin. ? One type of electrode is placed over a nerve and will give the nerve an electrical pulse. ? The other type of electrode is placed over the muscle that the nerve controls. It will record how long it takes the muscle to react to the electrical pulse. How does having electromyogram (EMG) and nerve conduction studies feel? During an EMG test, you may feel a quick, sharp pain when the needle electrode is put into a muscle. With nerve conduction studies, you will be able to feel the electrical pulses. The tests make some people anxious. Keep in mind that only a very low-voltage electrical current is used. And each electrical pulse is very quick. It lasts less than a second. How long do they take? · An EMG may take 30 to 60 minutes. · Nerve conduction tests may take from 15 minutes to 1 hour or more. It depends on how many nerves and muscles your doctor tests. What happens after these tests?   · After the test, you may be sore and feel a tingling in your muscles. This may last for up to 2 days. · If any of the test areas are sore:  ? Put ice or a cold pack on the area for 10 to 20 minutes at a time. Put a thin cloth between the ice and your skin. ? Take an over-the-counter pain medicine, such as acetaminophen (Tylenol), ibuprofen (Advil, Motrin), or naproxen (Aleve). Be safe with medicines. Read and follow all instructions on the label. · You will probably be able to go home right away. It depends on the reason for the test.  · You can go back to your usual activities right away. When should you call for help? Watch closely for changes in your health, and be sure to contact your doctor if:  · Muscle pain from an EMG test gets worse or you have swelling, tenderness, or pus at any of the needle sites. · You have any problems that you think may be from the test.  · You have any questions about the test or have not received your results. Follow-up care is a key part of your treatment and safety. Be sure to make and go to all appointments, and call your doctor if you are having problems. It's also a good idea to keep a list of the medicines you take. Ask your doctor when you can expect to have your test results. Where can you learn more? Go to http://www.gray.com/  Enter [id number] in the search box to learn more about \"Electromyogram (EMG) and Nerve Conduction Studies: About These Tests. \"  Current as of: April 8, 2021               Content Version: 13.0  © 2006-2021 [employer]. Care instructions adapted under license by [employer] (which disclaims liability or warranty for this information). If you have questions about a medical condition or this instruction, always ask your healthcare professional. [place] [address] any warranty or liability for your use of this information. Rotator Cuff: Exercises  Introduction  Here are some examples of exercises for you to try.  The exercises may be suggested for a condition or for rehabilitation. Start each exercise slowly. Ease off the exercises if you start to have pain. You will be told when to start these exercises and which ones will work best for you. How to do the exercises  Pendulum swing    If you have pain in your back, do not do this exercise. 1. Hold on to a table or the back of a chair with your good arm. Then bend forward a little and let your sore arm hang straight down. This exercise does not use the arm muscles. Rather, use your legs and your hips to create movement that makes your arm swing freely. 2. Use the movement from your hips and legs to guide the slightly swinging arm back and forth like a pendulum (or elephant trunk). Then guide it in circles that start small (about the size of a dinner plate). Make the circles a bit larger each day, as your pain allows. 3. Do this exercise for 5 minutes, 5 to 7 times each day. 4. As you have less pain, try bending over a little farther to do this exercise. This will increase the amount of movement at your shoulder. Posterior stretching exercise    1. Hold the elbow of your injured arm with your other hand. 2. Use your hand to pull your injured arm gently up and across your body. You will feel a gentle stretch across the back of your injured shoulder. 3. Hold for at least 15 to 30 seconds. Then slowly lower your arm. 4. Repeat 2 to 4 times. Up-the-back stretch    Your doctor or physical therapist may want you to wait to do this stretch until you have regained most of your range of motion and strength. You can do this stretch in different ways. Hold any of these stretches for at least 15 to 30 seconds. Repeat them 2 to 4 times. 1. Light stretch: Put your hand in your back pocket. Let it rest there to stretch your shoulder. 2. Moderate stretch:  With your other hand, hold your injured arm (palm outward) behind your back by the wrist. Pull your arm up gently to stretch your shoulder. 3. Advanced stretch: Put a towel over your other shoulder. Put the hand of your injured arm behind your back. Now hold the back end of the towel. With the other hand, hold the front end of the towel in front of your body. Pull gently on the front end of the towel. This will bring your hand farther up your back to stretch your shoulder. Overhead stretch    1. Standing about an arm's length away, grasp onto a solid surface. You could use a countertop, a doorknob, or the back of a sturdy chair. 2. With your knees slightly bent, bend forward with your arms straight. Lower your upper body, and let your shoulders stretch. 3. As your shoulders are able to stretch farther, you may need to take a step or two backward. 4. Hold for at least 15 to 30 seconds. Then stand up and relax. If you had stepped back during your stretch, step forward so you can keep your hands on the solid surface. 5. Repeat 2 to 4 times. Shoulder flexion (lying down)    To make a wand for this exercise, use a piece of PVC pipe or a broom handle with the broom removed. Make the wand about a foot wider than your shoulders. 1. Lie on your back, holding a wand with both hands. Your palms should face down as you hold the wand. 2. Keeping your elbows straight, slowly raise your arms over your head. Raise them until you feel a stretch in your shoulders, upper back, and chest.  3. Hold for 15 to 30 seconds. 4. Repeat 2 to 4 times. Shoulder rotation (lying down)    To make a wand for this exercise, use a piece of PVC pipe or a broom handle with the broom removed. Make the wand about a foot wider than your shoulders. 1. Lie on your back. Hold a wand with both hands with your elbows bent and palms up. 2. Keep your elbows close to your body, and move the wand across your body toward the sore arm. 3. Hold for 8 to 12 seconds. 4. Repeat 2 to 4 times.   Wall climbing (to the side)    Avoid any movement that is straight to your side, and be careful not to arch your back. Your arm should stay about 30 degrees to the front of your side. 1. Stand with your side to a wall so that your fingers can just touch it at an angle about 30 degrees toward the front of your body. 2. Walk the fingers of your injured arm up the wall as high as pain permits. Try not to shrug your shoulder up toward your ear as you move your arm up. 3. Hold that position for a count of at least 15 to 20.  4. Walk your fingers back down to the starting position. 5. Repeat at least 2 to 4 times. Try to reach higher each time. Wall climbing (to the front)    During this stretching exercise, be careful not to arch your back. 1. Face a wall, and stand so your fingers can just touch it. 2. Keeping your shoulder down, walk the fingers of your injured arm up the wall as high as pain permits. (Don't shrug your shoulder up toward your ear.)  3. Hold your arm in that position for at least 15 to 30 seconds. 4. Slowly walk your fingers back down to where you started. 5. Repeat at least 2 to 4 times. Try to reach higher each time. Shoulder blade squeeze    1. Stand with your arms at your sides, and squeeze your shoulder blades together. Do not raise your shoulders up as you squeeze. 2. Hold 6 seconds. 3. Repeat 8 to 12 times. Scapular exercise: Arm reach    1. Lie flat on your back. This exercise is a very slight motion that starts with your arms raised (elbows straight, arms straight). 2. From this position, reach higher toward the sivan or ceiling. Keep your elbows straight. All motion should be from your shoulder blade only. 3. Relax your arms back to where you started. 4. Repeat 8 to 12 times. Arm raise to the side    During this strengthening exercise, your arm should stay about 30 degrees to the front of your side. 1. Slowly raise your injured arm to the side, with your thumb facing up. Raise your arm 60 degrees at the most (shoulder level is 90 degrees).   2. Hold the position for 3 to 5 seconds. Then lower your arm back to your side. If you need to, bring your \"good\" arm across your body and place it under the elbow as you lower your injured arm. Use your good arm to keep your injured arm from dropping down too fast.  3. Repeat 8 to 12 times. 4. When you first start out, don't hold any extra weight in your hand. As you get stronger, you may use a 1-pound to 2-pound dumbbell or a small can of food. Shoulder flexor and extensor exercise    These are isometric exercises. That means you contract your muscles without actually moving. 1. Push forward (flex): Stand facing a wall or doorjamb, about 6 inches or less back. Hold your injured arm against your body. Make a closed fist with your thumb on top. Then gently push your hand forward into the wall with about 25% to 50% of your strength. Don't let your body move backward as you push. Hold for about 6 seconds. Relax for a few seconds. Repeat 8 to 12 times. 2. Push backward (extend): Stand with your back flat against a wall. Your upper arm should be against the wall, with your elbow bent 90 degrees (your hand straight ahead). Push your elbow gently back against the wall with about 25% to 50% of your strength. Don't let your body move forward as you push. Hold for about 6 seconds. Relax for a few seconds. Repeat 8 to 12 times. Scapular exercise: Wall push-ups    This exercise is best done with your fingers somewhat turned out, rather than straight up and down. 1. Stand facing a wall, about 12 inches to 18 inches away. 2. Place your hands on the wall at shoulder height. 3. Slowly bend your elbows and bring your face to the wall. Keep your back and hips straight. 4. Push back to where you started. 5. Repeat 8 to 12 times. 6. When you can do this exercise against a wall comfortably, you can try it against a counter. You can then slowly progress to the end of a couch, then to a sturdy chair, and finally to the floor.   Scapular exercise: Retraction    For this exercise, you will need elastic exercise material, such as surgical tubing or Thera-Band. 1. Put the band around a solid object at about waist level. (A bedpost will work well.) Each hand should hold an end of the band. 2. With your elbows at your sides and bent to 90 degrees, pull the band back. Your shoulder blades should move toward each other. Then move your arms back where you started. 3. Repeat 8 to 12 times. 4. If you have good range of motion in your shoulders, try this exercise with your arms lifted out to the sides. Keep your elbows at a 90-degree angle. Raise the elastic band up to about shoulder level. Pull the band back to move your shoulder blades toward each other. Then move your arms back where you started. Internal rotator strengthening exercise    1. Start by tying a piece of elastic exercise material to a doorknob. You can use surgical tubing or Thera-Band. 2. Stand or sit with your shoulder relaxed and your elbow bent 90 degrees. Your upper arm should rest comfortably against your side. Squeeze a rolled towel between your elbow and your body for comfort. This will help keep your arm at your side. 3. Hold one end of the elastic band in the hand of the painful arm. 4. Slowly rotate your forearm toward your body until it touches your belly. Slowly move it back to where you started. 5. Keep your elbow and upper arm firmly tucked against the towel roll or at your side. 6. Repeat 8 to 12 times. External rotator strengthening exercise    1. Start by tying a piece of elastic exercise material to a doorknob. You can use surgical tubing or Thera-Band. (You may also hold one end of the band in each hand.)  2. Stand or sit with your shoulder relaxed and your elbow bent 90 degrees. Your upper arm should rest comfortably against your side. Squeeze a rolled towel between your elbow and your body for comfort. This will help keep your arm at your side.   3. Hold one end of the elastic band with the hand of the painful arm. 4. Start with your forearm across your belly. Slowly rotate the forearm out away from your body. Keep your elbow and upper arm tucked against the towel roll or the side of your body until you begin to feel tightness in your shoulder. Slowly move your arm back to where you started. 5. Repeat 8 to 12 times. Follow-up care is a key part of your treatment and safety. Be sure to make and go to all appointments, and call your doctor if you are having problems. It's also a good idea to know your test results and keep a list of the medicines you take. Where can you learn more? Go to http://www.gray.com/  Enter J005 in the search box to learn more about \"Rotator Cuff: Exercises. \"  Current as of: July 1, 2021               Content Version: 13.0  © 7401-5616 [email or web address]. Care instructions adapted under license by [employer] (which disclaims liability or warranty for this information). If you have questions about a medical condition or this instruction, always ask your healthcare professional. [address] any warranty or liability for your use of this information. Neck: Exercises  Introduction  Here are some examples of exercises for you to try. The exercises may be suggested for a condition or for rehabilitation. Start each exercise slowly. Ease off the exercises if you start to have pain. You will be told when to start these exercises and which ones will work best for you. How to do the exercises  Neck stretch    1. This stretch works best if you keep your shoulder down as you lean away from it. To help you remember to do this, start by relaxing your shoulders and lightly holding on to your thighs or your chair. 2. Tilt your head toward your shoulder and hold for 15 to 30 seconds. Let the weight of your head stretch your muscles.   3. If you would like a little added stretch, use your hand to gently and steadily pull your head toward your shoulder. For example, keeping your right shoulder down, lean your head to the left. 4. Repeat 2 to 4 times toward each shoulder. Diagonal neck stretch    1. Turn your head slightly toward the direction you will be stretching, and tilt your head diagonally toward your chest and hold for 15 to 30 seconds. 2. If you would like a little added stretch, use your hand to gently and steadily pull your head forward on the diagonal.  3. Repeat 2 to 4 times toward each side. Dorsal glide stretch    The dorsal glide stretches the back of the neck. If you feel pain, do not glide so far back. Some people find this exercise easier to do while lying on their backs with an ice pack on the neck. 1. Sit or stand tall and look straight ahead. 2. Slowly tuck your chin as you glide your head backward over your body  3. Hold for a count of 6, and then relax for up to 10 seconds. 4. Repeat 8 to 12 times. Chest and shoulder stretch    1. Sit or stand tall and glide your head backward as in the dorsal glide stretch. 2. Raise both arms so that your hands are next to your ears. 3. Take a deep breath, and as you breathe out, lower your elbows down and behind your back. You will feel your shoulder blades slide down and together, and at the same time you will feel a stretch across your chest and the front of your shoulders. 4. Hold for about 6 seconds, and then relax for up to 10 seconds. 5. Repeat 8 to 12 times. Strengthening: Hands on head    1. Move your head backward, forward, and side to side against gentle pressure from your hands, holding each position for about 6 seconds. 2. Repeat 8 to 12 times. Follow-up care is a key part of your treatment and safety. Be sure to make and go to all appointments, and call your doctor if you are having problems. It's also a good idea to know your test results and keep a list of the medicines you take. Where can you learn more?   Go to http://www.gray.com/  Enter P975 in the search box to learn more about \"Neck: Exercises. \"  Current as of: July 1, 2021               Content Version: 13.0  © 1446-3707 [employer]. Care instructions adapted under license by [employer] (which disclaims liability or warranty for this information). If you have questions about a medical condition or this instruction, always ask your healthcare professional. [place] [address] any warranty or liability for your use of this information. Shoulder Blade: Exercises  Introduction  Here are some examples of exercises for you to try. The exercises may be suggested for a condition or for rehabilitation. Start each exercise slowly. Ease off the exercises if you start to have pain. You will be told when to start these exercises and which ones will work best for you. How to do the exercises  Shoulder roll    5. Stand tall with your chin slightly tucked. Imagine that a string at the top of your head is pulling you straight up. 6. Keep your arms relaxed. All motion will be in your shoulders. 7. Shrug your shoulders up toward your ears, then up and back. [place] your shoulders down and back, like you're sliding your hands down into your back pants pockets. 8. Repeat the circles at least 2 to 4 times. 9. This exercise is also helpful anytime you want to relax. Lower neck and upper back stretch    5. With your arms about shoulder height, clasp your hands in front of you. 6. Drop your chin toward your chest.  7. Reach straight forward so you are rounding your upper back. Think about pulling your shoulder blades apart. Werner Curtis feel a stretch across your upper back and shoulders. Hold for at least 6 seconds. 8. Repeat 2 to 4 times. Triceps stretch    4. Reach your arm straight up. 5. Keeping your elbow in place, bend your arm and reach your hand down behind your back.   6. With your other hand, apply gentle pressure to the bent elbow. Horace Younger feel a stretch at the back of your upper arm and shoulder. Hold about 6 seconds. 7. Repeat 2 to 4 times with each arm. Shoulder stretch    6. Relax your shoulders. 7. Raise one arm to shoulder height, and reach it across your chest.  8. Pull the arm slightly toward you with your other arm. This will help you get a gentle stretch. Hold for about 6 seconds. 9. Repeat 2 to 4 times. Shoulder blade squeeze    5. Sit or stand up tall with your arms at your sides. 6. Keep your shoulders relaxed and down, not shrugged. 7. Squeeze your shoulder blades together. Hold for 6 seconds, then relax. 8. Repeat 8 to 12 times. Straight-arm shoulder blade squeeze    5. Sit or stand tall. Relax your shoulders. 6. With palms down, hold your elastic tubing or band straight out in front of you. 7. Start with slight tension in the tubing or band, with your hands about shoulder-width apart. 8. Slowly pull straight out to the sides, squeezing your shoulder blades together. Keep your arms straight and at shoulder height. Slowly release. 9. Repeat 8 to 12 times. Rowing    6. [place] your elastic tubing or band at about waist height. Take one end in each hand. 7. Sit or stand with your feet hip-width apart. 8. Hold your arms straight in front of you. Adjust your distance to create slight tension in the tubing or band. 9. Slightly tuck your chin. Relax your shoulders. 10. Without shrugging your shoulders, pull straight back. Your elbows will pass alongside your waist.  Pull-downs    6. [place] your elastic tubing or band in the top of a closed door. Take one end in each hand. 7. Either sit or stand, depending on what is more comfortable. If you feel unsteady, sit on a chair. 8. Start with your arms up and comfortably apart, elbows straight. There should be a slight tension in the tubing or band. 9. Slightly tuck your chin, and look straight ahead.   10. Keeping your back straight, slowly pull down and back, bending your elbows. 11. Stop where your hands are level with your chin, in a \"goalpost\" position. 12. Repeat 8 to 12 times. Chest T stretch    4. Lie on your back. Raise your knees so they are bent. Plant your feet on the floor, hip-width apart. 5. Tuck your chin, and relax your shoulders. 6. Reach your arms straight out to the sides. If you don't feel a mild stretch in your shoulders and across your chest, use a foam roll or a tightly rolled blanket under your spine, from your tailbone to your head. 7. Relax in this position for at least 15 to 30 seconds while you breathe normally. Repeat 2 to 4 times. 8. As you get used to this stretch, keep adding a little more time until you are able relax in this position for 2 or 3 minutes. When you can relax for at least 2 minutes, you only need to do the exercise 1 time per session. Chest goalpost stretch    5. Lie on your back. Raise your knees so they are bent. Plant your feet on the floor, hip-width apart. 6. Tuck your chin, and relax your shoulders. 7. Reach your arms straight out to the sides. 8. Bend your arms at the elbows, with your hands pointed toward the top of your head. Your arms should make an L on either side of your head. Your palms should be facing up. 9. If you don't feel a mild stretch in your shoulders and across your chest, use a foam roll or tightly rolled blanket under your spine, from your tailbone to your head. 10. Relax in this position for at least 15 to 30 seconds while you breathe normally. Repeat 2 to 4 times. 11. Each day you do this exercise, add a little more time until you can relax in this position for 2 or 3 minutes. When you can relax for at least 2 minutes, you only need to do the exercise 1 time per session. Follow-up care is a key part of your treatment and safety. Be sure to make and go to all appointments, and call your doctor if you are having problems.  It's also a good idea to know your test results and keep a list of the medicines you take. Where can you learn more? Go to http://www.gray.com/  Enter H745 in the search box to learn more about \"Shoulder Blade: Exercises. \"  Current as of: July 1, 2021               Content Version: 13.0  © 3150-4353 [employer]. Care instructions adapted under license by [employer] (which disclaims liability or warranty for this information). If you have questions about a medical condition or this instruction, always ask your healthcare professional. [place] [address] any warranty or liability for your use of this information.

## 2021-10-21 NOTE — PROGRESS NOTES
[address]  [address], [address]  [place], [address] [place] [address] Street  Phone: [phone]  Fax: [phone]        Yuriy Martino  : 1965  PCP: Isael Ron NP  10/21/2021    PROGRESS NOTE    Consent: Shyam Lentz is a 48 y.o. male who was seen by synchronous (real-time) audio-video technology, and/or her healthcare decision maker, is aware that this patient-initiated, Telehealth encounter on 10/21/2021 is a billable service, with coverage as determined by his/her insurance carrier. He/She is aware that he/she may receive a bill and has provided verbal consent to proceed: Yes. The visit was conducted in the office with the patient being in home through a Doxy platform. Visit video time start: 11:04 AM end: 11:19 AM      HISTORY OF PRESENT ILLNESS  Shyam Lentz is a 48 y.o. male who was seen as a new patient with c/o chronic neck and low back pain x 2+ years. He reported that he had cervical interlaminar injections in the past in [place] that did not provide any improvement of his pain. He believed he had trigger point injections in the past that provided some relief for his lower back pain. He had trigger fingers in his left hand - he was scheduled to have them evaluated the following week. He has h/o DM. He has found some improvement of his low back pain with wearing a girdle for support. He works as a . He believed that he has had a lumbar MRI in the past, but did not have the images or reports with him. Shyam Lentz is seen virtually for f/u. He attended PT (19; [facility]) but canceled the rest of his appointments as he was feeling better per PT note. Today, he c/o neck pain and tingling on the right side radiating into the RUE into all fingers except his pinky. On examination, he had a positive Suarez sign on the right.      Pain Scale: 10/10    Treatments patient has tried:  Physical therapy:Yes  Doing HEP: Yes  Non-opioid medications: Yes  Spinal injections: Yes - cervical WILLEM with another provider w/o benefit  Spinal surgery- No.   Last Cervical Spine MRI : none     reviewed. ASSESSMENT  Maximiliano Harper is a 48year-old male with neck pain radiating into the RUE. His symptoms are likely due to a right shoulder pain, compensatory cervical myofascial pain, and a carpal tunnel syndrome. However, a cervical radiculopathy cannot be excluded at this time. PLAN  1. RUE EMG - evaluate CTS   2. 10 mg Prednisone dose pack. 3. Provided exercises to add to HEP  4. Gabapentin taper 300 mg TID. Pt will f/u for RUE EMG (30 minutes) or sooner as needed. Diagnoses and all orders for this visit:    1. Numbness and tingling in right hand  -     EMG ONE EXTREMITY UPPER RT; Future  -     predniSONE (STERAPRED DS) 10 mg dose pack; See administration instruction per 10mg dose pack  -     gabapentin (NEURONTIN) 300 mg capsule; Take 1 Capsule by mouth three (3) times daily. Max Daily Amount: 900 mg.    2. Cervical myofascial pain syndrome    3. Right shoulder pain, unspecified chronicity         PAST MEDICAL HISTORY   Past Medical History:   Diagnosis Date    BPH with obstruction/lower urinary tract symptoms     Diabetes ([facility] [address]     Diabetes mellitus     Difficulty voiding     Elevated PSA     Erectile dysfunction     Essential hypertension     Gross hematuria     Hematospermia     Hx of prostate biopsy     benign     Hypertension        No past surgical history on file. Ana María Don MEDICATIONS    Current Outpatient Medications   Medication Sig Dispense Refill    tamsulosin (FLOMAX) 0.4 mg capsule Take 1 capsule by mouth twice daily 60 Cap 0    sildenafil citrate (VIAGRA) 100 mg tablet TAKE ONE TABLET BY MOUTH ONE HOUR PRIOR TO SEXUAL ACTIVITY 30 Tab 0    hydroCHLOROthiazide (HYDRODIURIL) 25 mg tablet Take 25 mg by mouth daily.  glyBURIDE (DIABETA) 5 mg tablet Take  by mouth Daily (before breakfast).       NIFEdipine ER (PROCARDIA XL) 30 mg ER tablet Take by mouth daily.  dutasteride (AVODART) 0.5 mg capsule Take 1 Cap by mouth daily (after dinner). 90 Cap 3    pravastatin (PRAVACHOL) 40 mg tablet TAKE 1 TABLET BY MOUTH ONCE DAILY  0    aspirin delayed-release 81 mg tablet TAKE 1 TABLET BY MOUTH ONCE DAILY  11    metFORMIN (GLUCOPHAGE) 500 mg tablet Take  by mouth two (2) times daily (with meals).  insulin NPH/insulin regular (HUMULIN 70/30 U-100 INSULIN) 100 unit/mL (70-30) injection by SubCUTAneous route.  losartan (COZAAR) 100 mg tablet Take 100 mg by mouth daily.  amLODIPine (NORVASC) 5 mg tablet Take 5 mg by mouth daily. ALLERGIES  No Known Allergies       SOCIAL HISTORY    Social History     Socioeconomic History    Marital status: SINGLE     Spouse name: Not on file    Number of children: Not on file    Years of education: Not on file    Highest education level: Not on file   Tobacco Use    Smoking status: Never Smoker    Smokeless tobacco: Never Used   Substance and Sexual Activity    Alcohol use: Yes   Social History Narrative    ** Merged History Encounter **          Social Determinants of Health     Financial Resource Strain:     Difficulty of Paying Living Expenses:    Food Insecurity:     Worried About Running Out of Food in the Last Year:     Ran Out of Food in the Last Year:    Transportation Needs:     Lack of Transportation (Medical):      Lack of Transportation (Non-Medical):    Physical Activity:     Days of Exercise per Week:     Minutes of Exercise per Session:    Stress:     Feeling of Stress :    Social Connections:     Frequency of Communication with Friends and Family:     Frequency of Social Gatherings with Friends and Family:     Attends Congregation Services:     Active Member of Clubs or Organizations:     Attends Club or Organization Meetings:     Marital Status:        FAMILY HISTORY  Family History   Problem Relation Age of Onset    Hypertension Mother     Diabetes Father    [facility] Diabetes Mother     Hypertension Father     Diabetes Brother     Hypertension Brother          REVIEW OF SYSTEMS  ROS     PHYSICAL EXAMINATION    Pain Assessment  10/21/2021   Location of Pain Neck   Pain Location Comment shoulder,neck hand, fingers   Severity of Pain 10   Quality of Pain Dull; Other (Comment)   Quality of Pain Comment numbness tingling   Duration of Pain Persistent   Frequency of Pain Constant   Date Pain First Started (No Data)   Date Pain First Started Comment years   Aggravating Factors Other (Comment)   Aggravating Factors Comment laying down   Limiting Behavior Yes   Relieving Factors Nothing   Result of Injury No           -Constitutional: Healthy appearing, well developed, alert, in no acute distress  - Eyes: Conjunctiva clear, lids unremarkable, sclera anicteric  - Ears, nose, mouth, and throat: External inspection head, face, ears and nose identifies normal appearance without obvious deformity.  -Neck: No asymmetry, no masses, trachea is midline, and normal overall appearance.  -Respiratory: Respirations are even and unlabored. -Musculoskeletal: No cyanosis, clubbing, or edema observed  -Musculoskeletal: Able to walk without assistance with normal gait pattern  -Musculoskeletal: Inspection of the following identifies no gross deformity, misalignment, or asymmetry:   -Head/neck   -Spine   -Ribs/pelvis   -Right upper extremity    -Left upper extremity    -Right lower extremity  -Left lower extremity  -Musculoskeletal: Assessment of range of motion of the following identifies no gross limitations:    -Head/neck   -Spine   -Ribs/pelvis   -Right upper extremity - Positive Suarez sign on the right    -Left upper extremity    -Right lower extremity   -Left lower extremity  -Skin: Head and neck skin with no lesions or rash  -Neurologic: Cranial nerves 3-12 grossly intact. -Psychiatric: Judgement and insight intact.     The limitations of a telemedicine visit including the inability to perform a detailed physical examination may miss significant findings was presented to the patient, and the patient still elected to proceed with the telemedicine visit. RADIOGRAPHS/DATA  Cervical Spine XR images taken on 10/11/21:  VERTEBRAE AND ALIGNMENT: Segments are normal in height and alignment. DISC SPACES: Prominent osteophytes at multiple levels with mild loss of disc space height. FACET JOINTS AND FORAMINA: Neural foraminal narrowing on the right at C6-7     PARASPINOUS SOFT TISSUES: Unremarkable. ADDITIONAL: None.     _______________     IMPRESSION     Multilevel degenerative disc disease. Interval increase in degenerative changes at C6-7     Cervical XR images taken on 3/21/19 personally reviewed with patient:  Lean to the right. Facet sclerosis  Anterior endplate osteophytes  Disc space narrowing at C5-6 and C6-7     Lumbar XR images taken on 3/21/19 personally reviewed with patient:  Normal alignment. No significant facet sclerosis,  Slight lean to the L  No obvious compression fractures or instabilities. Lumbar XR images taken on 5/16/18 personally reviewed with patient:  Findings: Minimal dextrocurvature. Alignment is otherwise normal. No listhesis elicited with flexion/extension. Vertebral body height is preserved. Mild multilevel degenerative disc changes. Facets appear normal. The regional soft tissues are unremarkable. IMPRESSION  IMPRESSION:     Mild degenerative disc changes. No evidence of instability.     IMPRESSION:     Mild degenerative disc changes. No evidence of instability.  reviewed    Mr. Maru Christian has a reminder for a \"due or due soon\" health maintenance. I have asked that he contact his primary care provider for follow-up on this health maintenance.      Pursuant to the emergency declaration under the [address] [place] [address] and the [employer] and [employer] Act, this Virtual  Visit was conducted, with patient's consent, to reduce the patient's risk of exposure to COVID-19 and provide continuity of care for an established patient. Services were provided through a video synchronous discussion virtually to substitute for in-person clinic visit. CPT Codes 67975-42323 for Established Patients may apply to this Telehealth Visit  Time-based coding, delete if not needed: I spent at least 15 minutes with this established patient, and >50% of the time was spent counseling and/or coordinating care. Written by Kai Reyes, as dictated by Dr. Reggie Ribera.

## 2021-10-28 DIAGNOSIS — R20.0 NUMBNESS AND TINGLING IN RIGHT HAND: ICD-10-CM

## 2021-10-28 DIAGNOSIS — R20.2 NUMBNESS AND TINGLING IN RIGHT HAND: ICD-10-CM

## 2021-11-01 ENCOUNTER — TELEPHONE (OUTPATIENT)
Dept: ORTHOPEDIC SURGERY | Age: 56
End: 2021-11-01

## 2021-11-01 NOTE — TELEPHONE ENCOUNTER
I reached voice mail identified as Dago Purdy and left a detailed message stating EMG services are covered by [facility]. He can be scheduled for EMG RUE at this convenience. I provided my hours and direct number requesting a return call.

## 2021-11-01 NOTE — TELEPHONE ENCOUNTER
Patient called stating he was advised at his last appointment we would check if his insurance will cover an EMG. He wanted to know if there was an update on this. Please advise patient at [phone].

## 2021-11-03 ENCOUNTER — OFFICE VISIT (OUTPATIENT)
Dept: ORTHOPEDIC SURGERY | Age: 56
End: 2021-11-03
Payer: MEDICAID

## 2021-11-03 VITALS
SYSTOLIC BLOOD PRESSURE: 169 MMHG | RESPIRATION RATE: 16 BRPM | BODY MASS INDEX: 30.93 KG/M2 | DIASTOLIC BLOOD PRESSURE: 81 MMHG | HEART RATE: 70 BPM | TEMPERATURE: 96.7 F | WEIGHT: 241 LBS | OXYGEN SATURATION: 98 % | HEIGHT: 74 IN

## 2021-11-03 DIAGNOSIS — G56.01 CARPAL TUNNEL SYNDROME, RIGHT: ICD-10-CM

## 2021-11-03 DIAGNOSIS — R94.131 ABNORMAL EMG: Primary | ICD-10-CM

## 2021-11-03 PROCEDURE — 99213 OFFICE O/P EST LOW 20 MIN: CPT | Performed by: PHYSICAL MEDICINE & REHABILITATION

## 2021-11-03 PROCEDURE — 95886 MUSC TEST DONE W/N TEST COMP: CPT | Performed by: PHYSICAL MEDICINE & REHABILITATION

## 2021-11-03 PROCEDURE — 95909 NRV CNDJ TST 5-6 STUDIES: CPT | Performed by: PHYSICAL MEDICINE & REHABILITATION

## 2021-11-03 NOTE — PATIENT INSTRUCTIONS
Rotator Cuff: Exercises  Introduction  Here are some examples of exercises for you to try. The exercises may be suggested for a condition or for rehabilitation. Start each exercise slowly. Ease off the exercises if you start to have pain. You will be told when to start these exercises and which ones will work best for you. How to do the exercises  Pendulum swing    If you have pain in your back, do not do this exercise. 1. Hold on to a table or the back of a chair with your good arm. Then bend forward a little and let your sore arm hang straight down. This exercise does not use the arm muscles. Rather, use your legs and your hips to create movement that makes your arm swing freely. 2. Use the movement from your hips and legs to guide the slightly swinging arm back and forth like a pendulum (or elephant trunk). Then guide it in circles that start small (about the size of a dinner plate). Make the circles a bit larger each day, as your pain allows. 3. Do this exercise for 5 minutes, 5 to 7 times each day. 4. As you have less pain, try bending over a little farther to do this exercise. This will increase the amount of movement at your shoulder. Posterior stretching exercise    1. Hold the elbow of your injured arm with your other hand. 2. Use your hand to pull your injured arm gently up and across your body. You will feel a gentle stretch across the back of your injured shoulder. 3. Hold for at least 15 to 30 seconds. Then slowly lower your arm. 4. Repeat 2 to 4 times. Up-the-back stretch    Your doctor or physical therapist may want you to wait to do this stretch until you have regained most of your range of motion and strength. You can do this stretch in different ways. Hold any of these stretches for at least 15 to 30 seconds. Repeat them 2 to 4 times. 1. Light stretch: Put your hand in your back pocket. Let it rest there to stretch your shoulder. 2. Moderate stretch:  With your other hand, hold your injured arm (palm outward) behind your back by the wrist. Pull your arm up gently to stretch your shoulder. 3. Advanced stretch: Put a towel over your other shoulder. Put the hand of your injured arm behind your back. Now hold the back end of the towel. With the other hand, hold the front end of the towel in front of your body. Pull gently on the front end of the towel. This will bring your hand farther up your back to stretch your shoulder. Overhead stretch    1. Standing about an arm's length away, grasp onto a solid surface. You could use a countertop, a doorknob, or the back of a sturdy chair. 2. With your knees slightly bent, bend forward with your arms straight. Lower your upper body, and let your shoulders stretch. 3. As your shoulders are able to stretch farther, you may need to take a step or two backward. 4. Hold for at least 15 to 30 seconds. Then stand up and relax. If you had stepped back during your stretch, step forward so you can keep your hands on the solid surface. 5. Repeat 2 to 4 times. Shoulder flexion (lying down)    To make a wand for this exercise, use a piece of PVC pipe or a broom handle with the broom removed. Make the wand about a foot wider than your shoulders. 1. Lie on your back, holding a wand with both hands. Your palms should face down as you hold the wand. 2. Keeping your elbows straight, slowly raise your arms over your head. Raise them until you feel a stretch in your shoulders, upper back, and chest.  3. Hold for 15 to 30 seconds. 4. Repeat 2 to 4 times. Shoulder rotation (lying down)    To make a wand for this exercise, use a piece of PVC pipe or a broom handle with the broom removed. Make the wand about a foot wider than your shoulders. 1. Lie on your back. Hold a wand with both hands with your elbows bent and palms up. 2. Keep your elbows close to your body, and move the wand across your body toward the sore arm. 3. Hold for 8 to 12 seconds.   4. Repeat 2 to 4 times. Wall climbing (to the side)    Avoid any movement that is straight to your side, and be careful not to arch your back. Your arm should stay about 30 degrees to the front of your side. 1. Stand with your side to a wall so that your fingers can just touch it at an angle about 30 degrees toward the front of your body. 2. Walk the fingers of your injured arm up the wall as high as pain permits. Try not to shrug your shoulder up toward your ear as you move your arm up. 3. Hold that position for a count of at least 15 to 20.  4. Walk your fingers back down to the starting position. 5. Repeat at least 2 to 4 times. Try to reach higher each time. Wall climbing (to the front)    During this stretching exercise, be careful not to arch your back. 1. Face a wall, and stand so your fingers can just touch it. 2. Keeping your shoulder down, walk the fingers of your injured arm up the wall as high as pain permits. (Don't shrug your shoulder up toward your ear.)  3. Hold your arm in that position for at least 15 to 30 seconds. 4. Slowly walk your fingers back down to where you started. 5. Repeat at least 2 to 4 times. Try to reach higher each time. Shoulder blade squeeze    1. Stand with your arms at your sides, and squeeze your shoulder blades together. Do not raise your shoulders up as you squeeze. 2. Hold 6 seconds. 3. Repeat 8 to 12 times. Scapular exercise: Arm reach    1. Lie flat on your back. This exercise is a very slight motion that starts with your arms raised (elbows straight, arms straight). 2. From this position, reach higher toward the sivan or ceiling. Keep your elbows straight. All motion should be from your shoulder blade only. 3. Relax your arms back to where you started. 4. Repeat 8 to 12 times. Arm raise to the side    During this strengthening exercise, your arm should stay about 30 degrees to the front of your side.   1. Slowly raise your injured arm to the side, with your thumb facing up. Raise your arm 60 degrees at the most (shoulder level is 90 degrees). 2. Hold the position for 3 to 5 seconds. Then lower your arm back to your side. If you need to, bring your \"good\" arm across your body and place it under the elbow as you lower your injured arm. Use your good arm to keep your injured arm from dropping down too fast.  3. Repeat 8 to 12 times. 4. When you first start out, don't hold any extra weight in your hand. As you get stronger, you may use a 1-pound to 2-pound dumbbell or a small can of food. Shoulder flexor and extensor exercise    These are isometric exercises. That means you contract your muscles without actually moving. 1. Push forward (flex): Stand facing a wall or doorjamb, about 6 inches or less back. Hold your injured arm against your body. Make a closed fist with your thumb on top. Then gently push your hand forward into the wall with about 25% to 50% of your strength. Don't let your body move backward as you push. Hold for about 6 seconds. Relax for a few seconds. Repeat 8 to 12 times. 2. Push backward (extend): Stand with your back flat against a wall. Your upper arm should be against the wall, with your elbow bent 90 degrees (your hand straight ahead). Push your elbow gently back against the wall with about 25% to 50% of your strength. Don't let your body move forward as you push. Hold for about 6 seconds. Relax for a few seconds. Repeat 8 to 12 times. Scapular exercise: Wall push-ups    This exercise is best done with your fingers somewhat turned out, rather than straight up and down. 1. Stand facing a wall, about 12 inches to 18 inches away. 2. Place your hands on the wall at shoulder height. 3. Slowly bend your elbows and bring your face to the wall. Keep your back and hips straight. 4. Push back to where you started. 5. Repeat 8 to 12 times. 6. When you can do this exercise against a wall comfortably, you can try it against a counter.  You can then slowly progress to the end of a couch, then to a sturdy chair, and finally to the floor. Scapular exercise: Retraction    For this exercise, you will need elastic exercise material, such as surgical tubing or Thera-Band. 1. Put the band around a solid object at about waist level. (A bedpost will work well.) Each hand should hold an end of the band. 2. With your elbows at your sides and bent to 90 degrees, pull the band back. Your shoulder blades should move toward each other. Then move your arms back where you started. 3. Repeat 8 to 12 times. 4. If you have good range of motion in your shoulders, try this exercise with your arms lifted out to the sides. Keep your elbows at a 90-degree angle. Raise the elastic band up to about shoulder level. Pull the band back to move your shoulder blades toward each other. Then move your arms back where you started. Internal rotator strengthening exercise    1. Start by tying a piece of elastic exercise material to a doorknob. You can use surgical tubing or Thera-Band. 2. Stand or sit with your shoulder relaxed and your elbow bent 90 degrees. Your upper arm should rest comfortably against your side. Squeeze a rolled towel between your elbow and your body for comfort. This will help keep your arm at your side. 3. Hold one end of the elastic band in the hand of the painful arm. 4. Slowly rotate your forearm toward your body until it touches your belly. Slowly move it back to where you started. 5. Keep your elbow and upper arm firmly tucked against the towel roll or at your side. 6. Repeat 8 to 12 times. External rotator strengthening exercise    1. Start by tying a piece of elastic exercise material to a doorknob. You can use surgical tubing or Thera-Band. (You may also hold one end of the band in each hand.)  2. Stand or sit with your shoulder relaxed and your elbow bent 90 degrees. Your upper arm should rest comfortably against your side.  Squeeze a rolled towel between your elbow and your body for comfort. This will help keep your arm at your side. 3. Hold one end of the elastic band with the hand of the painful arm. 4. Start with your forearm across your belly. Slowly rotate the forearm out away from your body. Keep your elbow and upper arm tucked against the towel roll or the side of your body until you begin to feel tightness in your shoulder. Slowly move your arm back to where you started. 5. Repeat 8 to 12 times. Follow-up care is a key part of your treatment and safety. Be sure to make and go to all appointments, and call your doctor if you are having problems. It's also a good idea to know your test results and keep a list of the medicines you take. Where can you learn more? Go to http://www.gray.com/  Enter J005 in the search box to learn more about \"Rotator Cuff: Exercises. \"  Current as of: July 1, 2021               Content Version: 13.0  © 2006-2021 [employer]. Care instructions adapted under license by [employer] (which disclaims liability or warranty for this information). If you have questions about a medical condition or this instruction, always ask your healthcare professional. [place] [address] any warranty or liability for your use of this information. Rotator Cuff: Exercises  Introduction  Here are some examples of exercises for you to try. The exercises may be suggested for a condition or for rehabilitation. Start each exercise slowly. Ease off the exercises if you start to have pain. You will be told when to start these exercises and which ones will work best for you. How to do the exercises  Pendulum swing    If you have pain in your back, do not do this exercise. 5. Hold on to a table or the back of a chair with your good arm. Then bend forward a little and let your sore arm hang straight down. This exercise does not use the arm muscles.  Rather, use your legs and your hips to create movement that makes your arm swing freely. 6. Use the movement from your hips and legs to guide the slightly swinging arm back and forth like a pendulum (or elephant trunk). Then guide it in circles that start small (about the size of a dinner plate). Make the circles a bit larger each day, as your pain allows. 7. Do this exercise for 5 minutes, 5 to 7 times each day. 8. As you have less pain, try bending over a little farther to do this exercise. This will increase the amount of movement at your shoulder. Posterior stretching exercise    5. Hold the elbow of your injured arm with your other hand. 6. Use your hand to pull your injured arm gently up and across your body. You will feel a gentle stretch across the back of your injured shoulder. 7. Hold for at least 15 to 30 seconds. Then slowly lower your arm. 8. Repeat 2 to 4 times. Up-the-back stretch    Your doctor or physical therapist may want you to wait to do this stretch until you have regained most of your range of motion and strength. You can do this stretch in different ways. Hold any of these stretches for at least 15 to 30 seconds. Repeat them 2 to 4 times. 4. Light stretch: Put your hand in your back pocket. Let it rest there to stretch your shoulder. 5. Moderate stretch: With your other hand, hold your injured arm (palm outward) behind your back by the wrist. Pull your arm up gently to stretch your shoulder. 6. Advanced stretch: Put a towel over your other shoulder. Put the hand of your injured arm behind your back. Now hold the back end of the towel. With the other hand, hold the front end of the towel in front of your body. Pull gently on the front end of the towel. This will bring your hand farther up your back to stretch your shoulder. Overhead stretch    6. Standing about an arm's length away, grasp onto a solid surface. You could use a countertop, a doorknob, or the back of a sturdy chair.   7. With your knees slightly bent, bend forward with your arms straight. Lower your upper body, and let your shoulders stretch. 8. As your shoulders are able to stretch farther, you may need to take a step or two backward. 9. Hold for at least 15 to 30 seconds. Then stand up and relax. If you had stepped back during your stretch, step forward so you can keep your hands on the solid surface. 10. Repeat 2 to 4 times. Shoulder flexion (lying down)    To make a wand for this exercise, use a piece of PVC pipe or a broom handle with the broom removed. Make the wand about a foot wider than your shoulders. 5. Lie on your back, holding a wand with both hands. Your palms should face down as you hold the wand. 6. Keeping your elbows straight, slowly raise your arms over your head. Raise them until you feel a stretch in your shoulders, upper back, and chest.  7. Hold for 15 to 30 seconds. 8. Repeat 2 to 4 times. Shoulder rotation (lying down)    To make a wand for this exercise, use a piece of PVC pipe or a broom handle with the broom removed. Make the wand about a foot wider than your shoulders. 5. Lie on your back. Hold a wand with both hands with your elbows bent and palms up. 6. Keep your elbows close to your body, and move the wand across your body toward the sore arm. 7. Hold for 8 to 12 seconds. 8. Repeat 2 to 4 times. Wall climbing (to the side)    Avoid any movement that is straight to your side, and be careful not to arch your back. Your arm should stay about 30 degrees to the front of your side. 6. Stand with your side to a wall so that your fingers can just touch it at an angle about 30 degrees toward the front of your body. 7. Walk the fingers of your injured arm up the wall as high as pain permits. Try not to shrug your shoulder up toward your ear as you move your arm up. 8. Hold that position for a count of at least 15 to 20.  9. Walk your fingers back down to the starting position. 10. Repeat at least 2 to 4 times.  Try to reach higher each time. Wall climbing (to the front)    During this stretching exercise, be careful not to arch your back. 6. Face a wall, and stand so your fingers can just touch it. 7. Keeping your shoulder down, walk the fingers of your injured arm up the wall as high as pain permits. (Don't shrug your shoulder up toward your ear.)  8. Hold your arm in that position for at least 15 to 30 seconds. 9. Slowly walk your fingers back down to where you started. 10. Repeat at least 2 to 4 times. Try to reach higher each time. Shoulder blade squeeze    4. Stand with your arms at your sides, and squeeze your shoulder blades together. Do not raise your shoulders up as you squeeze. 5. Hold 6 seconds. 6. Repeat 8 to 12 times. Scapular exercise: Arm reach    5. Lie flat on your back. This exercise is a very slight motion that starts with your arms raised (elbows straight, arms straight). 6. From this position, reach higher toward the sivan or ceiling. Keep your elbows straight. All motion should be from your shoulder blade only. 7. Relax your arms back to where you started. 8. Repeat 8 to 12 times. Arm raise to the side    During this strengthening exercise, your arm should stay about 30 degrees to the front of your side. 5. Slowly raise your injured arm to the side, with your thumb facing up. Raise your arm 60 degrees at the most (shoulder level is 90 degrees). 6. Hold the position for 3 to 5 seconds. Then lower your arm back to your side. If you need to, bring your \"good\" arm across your body and place it under the elbow as you lower your injured arm. Use your good arm to keep your injured arm from dropping down too fast.  7. Repeat 8 to 12 times. 8. When you first start out, don't hold any extra weight in your hand. As you get stronger, you may use a 1-pound to 2-pound dumbbell or a small can of food. Shoulder flexor and extensor exercise    These are isometric exercises.  That means you contract your muscles without actually moving. 3. Push forward (flex): Stand facing a wall or doorjamb, about 6 inches or less back. Hold your injured arm against your body. Make a closed fist with your thumb on top. Then gently push your hand forward into the wall with about 25% to 50% of your strength. Don't let your body move backward as you push. Hold for about 6 seconds. Relax for a few seconds. Repeat 8 to 12 times. 4. Push backward (extend): Stand with your back flat against a wall. Your upper arm should be against the wall, with your elbow bent 90 degrees (your hand straight ahead). Push your elbow gently back against the wall with about 25% to 50% of your strength. Don't let your body move forward as you push. Hold for about 6 seconds. Relax for a few seconds. Repeat 8 to 12 times. Scapular exercise: Wall push-ups    This exercise is best done with your fingers somewhat turned out, rather than straight up and down. 7. Stand facing a wall, about 12 inches to 18 inches away. 8. Place your hands on the wall at shoulder height. 9. Slowly bend your elbows and bring your face to the wall. Keep your back and hips straight. 10. Push back to where you started. 11. Repeat 8 to 12 times. 12. When you can do this exercise against a wall comfortably, you can try it against a counter. You can then slowly progress to the end of a couch, then to a sturdy chair, and finally to the floor. Scapular exercise: Retraction    For this exercise, you will need elastic exercise material, such as surgical tubing or Thera-Band. 5. Put the band around a solid object at about waist level. (A bedpost will work well.) Each hand should hold an end of the band. 6. With your elbows at your sides and bent to 90 degrees, pull the band back. Your shoulder blades should move toward each other. Then move your arms back where you started. 7. Repeat 8 to 12 times.   8. If you have good range of motion in your shoulders, try this exercise with your arms lifted out to the sides. Keep your elbows at a 90-degree angle. Raise the elastic band up to about shoulder level. Pull the band back to move your shoulder blades toward each other. Then move your arms back where you started. Internal rotator strengthening exercise    7. Start by tying a piece of elastic exercise material to a doorknob. You can use surgical tubing or Thera-Band. 8. Stand or sit with your shoulder relaxed and your elbow bent 90 degrees. Your upper arm should rest comfortably against your side. Squeeze a rolled towel between your elbow and your body for comfort. This will help keep your arm at your side. 9. Hold one end of the elastic band in the hand of the painful arm. 10. Slowly rotate your forearm toward your body until it touches your belly. Slowly move it back to where you started. 11. Keep your elbow and upper arm firmly tucked against the towel roll or at your side. 12. Repeat 8 to 12 times. External rotator strengthening exercise    6. Start by tying a piece of elastic exercise material to a doorknob. You can use surgical tubing or Thera-Band. (You may also hold one end of the band in each hand.)  7. Stand or sit with your shoulder relaxed and your elbow bent 90 degrees. Your upper arm should rest comfortably against your side. Squeeze a rolled towel between your elbow and your body for comfort. This will help keep your arm at your side. 8. Hold one end of the elastic band with the hand of the painful arm. 9. Start with your forearm across your belly. Slowly rotate the forearm out away from your body. Keep your elbow and upper arm tucked against the towel roll or the side of your body until you begin to feel tightness in your shoulder. Slowly move your arm back to where you started. 10. Repeat 8 to 12 times. Follow-up care is a key part of your treatment and safety. Be sure to make and go to all appointments, and call your doctor if you are having problems.  It's also a good idea to know your test results and keep a list of the medicines you take. Where can you learn more? Go to http://www.gray.com/  Enter J005 in the search box to learn more about \"Rotator Cuff: Exercises. \"  Current as of: July 1, 2021               Content Version: 13.0  © 2006-2021 [employer]. Care instructions adapted under license by [employer] (which disclaims liability or warranty for this information). If you have questions about a medical condition or this instruction, always ask your healthcare professional. [address] any warranty or liability for your use of this information. Neck: Exercises  Introduction  Here are some examples of exercises for you to try. The exercises may be suggested for a condition or for rehabilitation. Start each exercise slowly. Ease off the exercises if you start to have pain. You will be told when to start these exercises and which ones will work best for you. How to do the exercises  Neck stretch    1. This stretch works best if you keep your shoulder down as you lean away from it. To help you remember to do this, start by relaxing your shoulders and lightly holding on to your thighs or your chair. 2. Tilt your head toward your shoulder and hold for 15 to 30 seconds. Let the weight of your head stretch your muscles. 3. If you would like a little added stretch, use your hand to gently and steadily pull your head toward your shoulder. For example, keeping your right shoulder down, lean your head to the left. 4. Repeat 2 to 4 times toward each shoulder. Diagonal neck stretch    1. Turn your head slightly toward the direction you will be stretching, and tilt your head diagonally toward your chest and hold for 15 to 30 seconds. 2. If you would like a little added stretch, use your hand to gently and steadily pull your head forward on the diagonal.  3. Repeat 2 to 4 times toward each side.   Dorsal glide stretch    The dorsal glide stretches the back of the neck. If you feel pain, do not glide so far back. Some people find this exercise easier to do while lying on their backs with an ice pack on the neck. 1. Sit or stand tall and look straight ahead. 2. Slowly tuck your chin as you glide your head backward over your body  3. Hold for a count of 6, and then relax for up to 10 seconds. 4. Repeat 8 to 12 times. Chest and shoulder stretch    1. Sit or stand tall and glide your head backward as in the dorsal glide stretch. 2. Raise both arms so that your hands are next to your ears. 3. Take a deep breath, and as you breathe out, lower your elbows down and behind your back. You will feel your shoulder blades slide down and together, and at the same time you will feel a stretch across your chest and the front of your shoulders. 4. Hold for about 6 seconds, and then relax for up to 10 seconds. 5. Repeat 8 to 12 times. Strengthening: Hands on head    1. Move your head backward, forward, and side to side against gentle pressure from your hands, holding each position for about 6 seconds. 2. Repeat 8 to 12 times. Follow-up care is a key part of your treatment and safety. Be sure to make and go to all appointments, and call your doctor if you are having problems. It's also a good idea to know your test results and keep a list of the medicines you take. Where can you learn more? Go to http://www.kim.com/  Enter P975 in the search box to learn more about \"Neck: Exercises. \"  Current as of: July 1, 2021               Content Version: 13.0  © 5629-6996 [employer]wise, [employer]. Care instructions adapted under license by [employer] (which disclaims liability or warranty for this information).  If you have questions about a medical condition or this instruction, always ask your healthcare professional. [address] any warranty or liability for your use of this information.

## 2021-11-03 NOTE — PROGRESS NOTES
[address]  [address], [address]  [address]  Phone: [phone]  Fax: [phone]        Merrick Hansen  : 1965  PCP: Yung Lauren NP  11/3/2021    ELECTROMYOGRAPHY AND NERVE CONDUCTION STUDIES    Perlita Iraheta was referred by Dr. Caro Bonilla for electrodiagnostic evaluation of right hand numbness and tingling. NCV & EMG Findings:  Evaluation of the right median motor nerve showed prolonged distal onset latency (5.5 ms). The right ulnar motor nerve showed reduced amplitude (6.4 mV). The right median sensory and the right ulnar sensory nerves showed no response (Wrist). All remaining nerves (as indicated in the following tables) were within normal limits. All examined muscles (as indicated in the following table) showed no evidence of electrical instability. INTERPRETATION    This is an abnormal electrodiagnostic examination. These findings may be consistent with:   1. Moderate median mononeuropathy at the right wrist (carpal tunnel syndrome)   2. 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 CLINICAL INTERPRETATION    His electrodiagnostic findings of median and ulnar mononeuropathy at the right wrist may be consistent with his right hand symptoms. The carpal tunnel is the most likely symptomatic given the distribution of his symptoms. PLAN  1. Referral to Dr. Hoa Vazquez for right CTS. 2. Provided neck and shoulder exercises to add to HEP. HISTORY OF PRESENT ILLNESS  Perlita Iraheta is a 48 y.o. male. Pt presents today for RUE EMG evaluation of right hand numbness and tingling. He also c/o right shoulder pain. He has h/o DM. He has tried Gabapentin without benefit.      PAST MEDICAL HISTORY   Past Medical History:   Diagnosis Date    BPH with obstruction/lower urinary tract symptoms     Diabetes ([facility] [address]     Diabetes mellitus     Difficulty voiding     Elevated PSA     Erectile dysfunction     Essential hypertension     Gross hematuria     Hematospermia     Hx of prostate biopsy     benign     Hypertension        History reviewed. No pertinent surgical history. Heddy  MEDICATIONS    Current Outpatient Medications   Medication Sig Dispense Refill    predniSONE (STERAPRED DS) 10 mg dose pack See administration instruction per 10mg dose pack 21 Tablet 0    gabapentin (NEURONTIN) 300 mg capsule Take 1 Capsule by mouth three (3) times daily. Max Daily Amount: 900 mg. 90 Capsule 2    tamsulosin (FLOMAX) 0.4 mg capsule Take 1 capsule by mouth twice daily 60 Cap 0    sildenafil citrate (VIAGRA) 100 mg tablet TAKE ONE TABLET BY MOUTH ONE HOUR PRIOR TO SEXUAL ACTIVITY 30 Tab 0    hydroCHLOROthiazide (HYDRODIURIL) 25 mg tablet Take 25 mg by mouth daily.  glyBURIDE (DIABETA) 5 mg tablet Take  by mouth Daily (before breakfast).  NIFEdipine ER (PROCARDIA XL) 30 mg ER tablet Take  by mouth daily.  dutasteride (AVODART) 0.5 mg capsule Take 1 Cap by mouth daily (after dinner). 90 Cap 3    pravastatin (PRAVACHOL) 40 mg tablet TAKE 1 TABLET BY MOUTH ONCE DAILY  0    aspirin delayed-release 81 mg tablet TAKE 1 TABLET BY MOUTH ONCE DAILY  11    metFORMIN (GLUCOPHAGE) 500 mg tablet Take  by mouth two (2) times daily (with meals).  insulin NPH/insulin regular (HUMULIN 70/30 U-100 INSULIN) 100 unit/mL (70-30) injection by SubCUTAneous route.  losartan (COZAAR) 100 mg tablet Take 100 mg by mouth daily.  amLODIPine (NORVASC) 5 mg tablet Take 5 mg by mouth daily.           ALLERGIES  No Known Allergies       SOCIAL HISTORY    Social History     Socioeconomic History    Marital status: SINGLE   Tobacco Use    Smoking status: Never Smoker    Smokeless tobacco: Never Used   Substance and Sexual Activity    Alcohol use: Yes   Social History Narrative    ** Merged History Encounter **            FAMILY HISTORY  Family History   Problem Relation Age of Onset    Hypertension Mother     Diabetes Father     Diabetes Mother     Hypertension Father     Diabetes Brother     Hypertension Brother          PHYSICAL EXAMINATION  Visit Vitals  BP (!) 169/81 (BP 1 Location: Right arm, BP Patient Position: Sitting, BP Cuff Size: Adult)   Pulse 70   Temp (!) 96.7 °F (35.9 °C) (Temporal)   Resp 16   Ht 6' 2\" (1.88 m)   Wt 241 lb (109.3 kg)   SpO2 98%   BMI 30.94 kg/m²       Pain Assessment  11/3/2021   Location of Pain Shoulder;Neck;Hand   Pain Location Comment -   Location Modifiers Right   Severity of Pain 8   Quality of Pain Aching   Quality of Pain Comment fingers numb   Duration of Pain Persistent   Frequency of Pain Constant   Date Pain First Started 10/6/2021   Date Pain First Started Comment -   Aggravating Factors (No Data)   Aggravating Factors Comment sleeping   Limiting Behavior Some   Relieving Factors Nothing   Result of Injury No           Constitutional:  Well developed, well nourished, in no acute distress. Psychiatric: Affect and mood are appropriate. Integumentary: No rashes or abrasions noted on exposed areas. SPINE/MUSCULOSKELETAL EXAM    On brief examination: See previous note.       NCV & EMG Findings:  Nerve Conduction Studies  Anti Sensory Summary Table     Stim Site NR Peak (ms) Norm Peak (ms) O-P Amp (µV) Norm O-P Amp Site1 Site2 Delta-P (ms) Dist (cm) Samir (m/s) Norm Samir (m/s)   Right DorsCutan Anti Sensory (Dorsum 5th MC)   Wrist    2.7  9.4  Wrist Dorsum 5th MC 2.7 0.0     Site 2    2.8  5.9          Right Median Anti Sensory (2nd Digit)   Wrist NR  <4  >13 Wrist 2nd Digit  14.0  >39   Palm    0.4 <2.3 22.0 >8 Palm 2nd Digit 0.4 7.0 175    Right Radial Anti Sensory (Base 1st Digit)   Wrist    2.3 2.8 14.6 11 Wrist Base 1st Digit 2.3 10.0 43    Right Ulnar Anti Sensory (5th Digit)   Wrist NR  <4.0  >9 Wrist 5th Digit  14.0  >38     Motor Summary Table     Stim Site NR Onset (ms) Norm Onset (ms) O-P Amp (mV) Norm O-P Amp Site1 Site2 Delta-0 (ms) Dist (cm) Samir (m/s) Norm Samir (m/s)   Right Median Motor (Abd Poll Brev)   Wrist    5.5 <4.5 7.6 >4.1 Elbow Wrist 5.2 26.5 51 >49   Elbow    10.7  7.0          Right Ulnar Motor (Abd Dig Min)   Wrist    3.3 <3.7 6.4 >7.9 B Elbow Wrist 3.9 22.0 56 >52   B Elbow    7.2  6.3  A Elbow B Elbow 1.9 10.0 53 >43   A Elbow    9.1  6.4            EMG     Side Muscle Nerve Root Ins Act Fibs Psw Amp Dur Poly Recrt Int Pa Levers Comment   Right Biceps Musculocut C5-6 Nml Nml Nml Nml Nml 0 Nml Nml    Right Triceps Radial C6-7-8 Nml Nml Nml Nml Nml 0 Nml Nml    Right PronatorTeres Median C6-7 Nml Nml Nml Nml Nml 0 Nml Nml    Right Abd Poll Brev Median C8-T1 Nml Nml Nml Nml Nml 0 Nml Nml    Right Abd Dig Min Ulnar C8-T1 Nml Nml Nml Nml Nml 0 Nml Nml    Right 1stDorInt Ulnar C8-T1 Nml Nml Nml Nml Nml 0 Nml Nml    Right Cervical Parasp Up Rami C1-3 Nml Nml Nml         Right Cervical Parasp Mid Rami C4-6 Nml Nml Nml         Right Cervical Parasp Low Rami C7-8 Nml Nml Nml             Nerve Conduction Studies  Anti Sensory Left/Right Comparison     Stim Site L Lat (ms) R Lat (ms) L-R Lat (ms) L Amp (µV) R Amp (µV) L-R Amp (%) Site1 Site2 L Samir (m/s) R Samir (m/s) L-R Samir (m/s)   DorsCutan Anti Sensory (Dorsum 5th MC)   Wrist  2.7   9.4  Wrist Dorsum 5th MC      Site 2  2.8   5.9         Median Anti Sensory (2nd Digit)   Wrist       Wrist 2nd Digit      Palm  0.4   22.0  Palm 2nd Digit  175    Radial Anti Sensory (Base 1st Digit)   Wrist  2.3   14.6  Wrist Base 1st Digit  43    Ulnar Anti Sensory (5th Digit)   Wrist       Wrist 5th Digit        Motor Left/Right Comparison     Stim Site L Lat (ms) R Lat (ms) L-R Lat (ms) L Amp (mV) R Amp (mV) L-R Amp (%) Site1 Site2 L Asmir (m/s) R Samir (m/s) L-R Samir (m/s)   Median Motor (Abd Poll Brev)   Wrist  5.5   7.6  Elbow Wrist  51    Elbow  10.7   7.0         Ulnar Motor (Abd Dig Min)   Wrist  3.3 6.4  B Elbow Wrist  56    B Elbow  7.2   6.3  A Elbow B Elbow  53    A Elbow  9.1   6.4               Waveforms:                      [facility]  OFFICE PROCEDURE PROGRESS NOTE        Chart reviewed for the following:   Benny ALONSO, have reviewed the History, Physical and updated the [facility] performed immediately prior to start of procedure:   IBenny, have performed the following reviews on Teresa Duran prior to the start of the procedure:            * Patient was identified by name and date of birth   * Agreement on procedure being performed was verified  * Risks and Benefits explained to the patient  * Procedure site verified and marked as necessary  * Patient was positioned for comfort  * Consent was signed and verified     Time: 1:54 PM    Date of procedure: 11/3/2021    Procedure performed by:  Martha Kramer MD    Provider accompanied by: Yasmin. Patient accompanied by: Self.     How tolerated by patient: tolerated the procedure well with no complications    Post Procedural Pain Scale: 0 - No Hurt    Comments: none    Written by Fortino Heredia [facility] as dictated by Benny Muse MD

## 2021-11-03 NOTE — PROGRESS NOTES
Chief Complaint   Patient presents with    Confirmation for Procedure(s)       Pt preferred language for health care discussion is english. Is someone accompanying this pt? no    Is the patient using any DME equipment during OV? no    Depression Screening:  No flowsheet data found. Learning Assessment:  Learning Assessment 5/9/2013   PRIMARY LEARNER Patient   PRIMARY LANGUAGE ENGLISH   LEARNER PREFERENCE PRIMARY DEMONSTRATION   ANSWERED BY patient   RELATIONSHIP SELF         Health Maintenance reviewed and discussed per provider. Yes        Advance Directive:  1. Do you have an advance directive in place? Patient Reply:no    2. If not, would you like material regarding how to put one in place? Patient Reply: no    Coordination of Care:  1. Have you been to the ER, urgent care clinic since your last visit? Hospitalized since your last visit? no    2. Have you seen or consulted any other health care providers outside of the [address] [place] [address] since your last visit? Include any pap smears or colon screening.  no

## 2021-12-01 ENCOUNTER — OFFICE VISIT (OUTPATIENT)
Dept: ORTHOPEDIC SURGERY | Age: 56
End: 2021-12-01
Payer: MEDICAID

## 2021-12-01 VITALS
WEIGHT: 240 LBS | HEIGHT: 73 IN | HEART RATE: 77 BPM | TEMPERATURE: 97.7 F | OXYGEN SATURATION: 96 % | BODY MASS INDEX: 31.81 KG/M2

## 2021-12-01 DIAGNOSIS — G56.21 CUBITAL TUNNEL SYNDROME, RIGHT: ICD-10-CM

## 2021-12-01 DIAGNOSIS — G56.01 CARPAL TUNNEL SYNDROME, RIGHT: Primary | ICD-10-CM

## 2021-12-01 PROCEDURE — 99213 OFFICE O/P EST LOW 20 MIN: CPT | Performed by: ORTHOPAEDIC SURGERY

## 2021-12-01 PROCEDURE — 20526 THER INJECTION CARP TUNNEL: CPT | Performed by: ORTHOPAEDIC SURGERY

## 2021-12-02 NOTE — PROGRESS NOTES
Kristine Coyne is a 55 y.o. male right handed unspecified employment. Worker's Compensation and legal considerations: none filed. Vitals:    12/01/21 1351   Pulse: 77   Temp: 97.7 °F (36.5 °C)   TempSrc: Temporal   SpO2: 96%   Weight: 240 lb (108.9 kg)   Height: 6' 1\" (1.854 m)   PainSc:  10 - Worst pain ever   PainLoc: Hand           Chief Complaint   Patient presents with    Hand Pain     right hand         HPI: Patient presents today with complaints of right hand numbness and tingling. He has recently had carpal tunnel syndrome and cubital tunnel syndrome diagnosed by EMG. Date of onset: Indeterminate    Injury: No    Prior Treatment:  No    Numbness/ Tingling: Yes: Comment: Right hand      ROS: Review of Systems - General ROS: negative  Psychological ROS: negative  ENT ROS: negative  Allergy and Immunology ROS: negative  Hematological and Lymphatic ROS: negative  Respiratory ROS: no cough, shortness of breath, or wheezing  Cardiovascular ROS: no chest pain or dyspnea on exertion  Gastrointestinal ROS: no abdominal pain, change in bowel habits, or black or bloody stools  Musculoskeletal ROS: negative  Neurological ROS: positive for - numbness/tingling  Dermatological ROS: negative    Past Medical History:   Diagnosis Date    BPH with obstruction/lower urinary tract symptoms     Diabetes ([address]     Diabetes mellitus     Difficulty voiding     Elevated PSA     Erectile dysfunction     Essential hypertension     Gross hematuria     Hematospermia     Hx of prostate biopsy     benign     Hypertension        History reviewed. No pertinent surgical history. Current Outpatient Medications   Medication Sig Dispense Refill    predniSONE (STERAPRED DS) 10 mg dose pack See administration instruction per 10mg dose pack 21 Tablet 0    gabapentin (NEURONTIN) 300 mg capsule Take 1 Capsule by mouth three (3) times daily.  Max Daily Amount: 900 mg. 90 Capsule 2    tamsulosin (FLOMAX) 0.4 mg capsule Take 1 capsule by mouth twice daily 60 Cap 0    sildenafil citrate (VIAGRA) 100 mg tablet TAKE ONE TABLET BY MOUTH ONE HOUR PRIOR TO SEXUAL ACTIVITY 30 Tab 0    hydroCHLOROthiazide (HYDRODIURIL) 25 mg tablet Take 25 mg by mouth daily.  glyBURIDE (DIABETA) 5 mg tablet Take  by mouth Daily (before breakfast).  NIFEdipine ER (PROCARDIA XL) 30 mg ER tablet Take  by mouth daily.  dutasteride (AVODART) 0.5 mg capsule Take 1 Cap by mouth daily (after dinner). 90 Cap 3    pravastatin (PRAVACHOL) 40 mg tablet TAKE 1 TABLET BY MOUTH ONCE DAILY  0    aspirin delayed-release 81 mg tablet TAKE 1 TABLET BY MOUTH ONCE DAILY  11    metFORMIN (GLUCOPHAGE) 500 mg tablet Take  by mouth two (2) times daily (with meals).  insulin NPH/insulin regular (HUMULIN 70/30 U-100 INSULIN) 100 unit/mL (70-30) injection by SubCUTAneous route.  losartan (COZAAR) 100 mg tablet Take 100 mg by mouth daily.  amLODIPine (NORVASC) 5 mg tablet Take 5 mg by mouth daily. No Known Allergies        PE:     Physical Exam  Vitals and nursing note reviewed. Constitutional:       General: He is not in acute distress. Appearance: Normal appearance. He is not ill-appearing. Cardiovascular:      Pulses: Normal pulses. Pulmonary:      Effort: Pulmonary effort is normal. No respiratory distress. Musculoskeletal:         General: No swelling, tenderness, deformity or signs of injury. Normal range of motion. Cervical back: Normal range of motion and neck supple. Right lower leg: No edema. Left lower leg: No edema. Skin:     General: Skin is warm and dry. Capillary Refill: Capillary refill takes less than 2 seconds. Findings: No bruising or erythema. Neurological:      General: No focal deficit present. Mental Status: He is alert and oriented to person, place, and time. Cranial Nerves: No cranial nerve deficit. Sensory: No sensory deficit.    Psychiatric:         Mood and Affect: Mood normal.         Behavior: Behavior normal.            NEUROVASCULAR    Examination L R Examination L R   Carpal Comp. - + Pronator Comp. - -   Carpal Tinel - + Pronator Tinel - -   Phalen's - + Pronator Stress - -   Cubital Comp. - - Guyon Comp. - -   Cubital Tinel - + Guyon Tinel - -   Elbow Hyperflexion - - Adson's - -   Spurling's - - SC Comp. - -   PCB Median abn - - SC Tinel - -   Radial Tinel - - IC Comp. - -   Digital Tinel - - IC Tinel - -   Radial 2-Pt WNL WNL Ulnar 2-Pt WNL WNL     Radial Pulse: 2+  Capillary Refill: < 2 sec  Mark: Not Performed  [employer]: Not Performed        NCV & EMG Findings:  Evaluation of the right median motor nerve showed prolonged distal onset latency (5.5 ms). The right ulnar motor nerve showed reduced amplitude (6.4 mV). The right median sensory and the right ulnar sensory nerves showed no response (Wrist). All remaining nerves (as indicated in the following tables) were within normal limits.       All examined muscles (as indicated in the following table) showed no evidence of electrical instability.       INTERPRETATION     This is an abnormal electrodiagnostic examination. These findings may be consistent with:   1. Moderate median mononeuropathy at the right wrist (carpal tunnel syndrome)   2. 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     CLINICAL INTERPRETATION     His electrodiagnostic findings of median and ulnar mononeuropathy at the right wrist may be consistent with his right hand symptoms. The carpal tunnel is the most likely symptomatic given the distribution of his symptoms. Imaging:     None indicated        ICD-10-CM ICD-9-CM    1. Carpal tunnel syndrome, right  G56.01 354.0 INJECT CARPAL TUNNEL      triamcinolone acetonide (KENALOG) 10 mg/mL injection 5 mg      AMB SUPPLY ORDER   2. Cubital tunnel syndrome, right  G56.21 354.2          Plan:     Right carpal tunnel injection and carpal tunnel brace. Follow-up and Dispositions    · Return if symptoms worsen or fail to improve, for Possible surgical discussion.           Plan was reviewed with patient, who verbalized agreement and understanding of the plan

## 2022-03-18 PROBLEM — R39.14 FEELING OF INCOMPLETE BLADDER EMPTYING: Status: ACTIVE | Noted: 2020-03-16

## 2022-03-18 PROBLEM — E11.69 ERECTILE DYSFUNCTION ASSOCIATED WITH TYPE 2 DIABETES MELLITUS (HCC): Status: ACTIVE | Noted: 2020-03-16

## 2022-03-18 PROBLEM — N52.1 ERECTILE DYSFUNCTION ASSOCIATED WITH TYPE 2 DIABETES MELLITUS (HCC): Status: ACTIVE | Noted: 2020-03-16

## 2022-03-19 ENCOUNTER — HOSPITAL ENCOUNTER (EMERGENCY)
Age: 57
Discharge: HOME | End: 2022-03-19
Attending: STUDENT IN AN ORGANIZED HEALTH CARE EDUCATION/TRAINING PROGRAM
Payer: MEDICAID

## 2022-03-19 ENCOUNTER — APPOINTMENT (OUTPATIENT)
Dept: GENERAL RADIOLOGY | Age: 57
End: 2022-03-19
Attending: PHYSICIAN ASSISTANT
Payer: MEDICAID

## 2022-03-19 VITALS
WEIGHT: 241 LBS | TEMPERATURE: 98.2 F | DIASTOLIC BLOOD PRESSURE: 98 MMHG | HEIGHT: 73 IN | RESPIRATION RATE: 16 BRPM | OXYGEN SATURATION: 99 % | HEART RATE: 82 BPM | BODY MASS INDEX: 31.94 KG/M2 | SYSTOLIC BLOOD PRESSURE: 168 MMHG

## 2022-03-19 DIAGNOSIS — M54.40 ACUTE MIDLINE LOW BACK PAIN WITH SCIATICA, SCIATICA LATERALITY UNSPECIFIED: Primary | ICD-10-CM

## 2022-03-19 PROBLEM — R97.20 ELEVATED PSA: Status: ACTIVE | Noted: 2020-03-16

## 2022-03-19 PROBLEM — N40.1 ENLARGED PROSTATE WITH LOWER URINARY TRACT SYMPTOMS (LUTS): Status: ACTIVE | Noted: 2020-03-16

## 2022-03-19 PROBLEM — R36.1 HEMATOSPERMIA: Status: ACTIVE | Noted: 2020-03-16

## 2022-03-19 PROBLEM — R35.1 NOCTURIA: Status: ACTIVE | Noted: 2020-03-16

## 2022-03-19 PROCEDURE — 99284 EMERGENCY DEPT VISIT MOD MDM: CPT

## 2022-03-19 PROCEDURE — 72100 X-RAY EXAM L-S SPINE 2/3 VWS: CPT

## 2022-03-19 PROCEDURE — 74011250636 HC RX REV CODE- 250/636: Performed by: PHYSICIAN ASSISTANT

## 2022-03-19 PROCEDURE — 96372 THER/PROPH/DIAG INJ SC/IM: CPT

## 2022-03-19 RX ORDER — KETOROLAC TROMETHAMINE 10 MG/1
10 TABLET, FILM COATED ORAL
Qty: 10 TABLET | Refills: 0 | Status: SHIPPED | OUTPATIENT
Start: 2022-03-19

## 2022-03-19 RX ORDER — KETOROLAC TROMETHAMINE 15 MG/ML
15 INJECTION, SOLUTION INTRAMUSCULAR; INTRAVENOUS
Status: COMPLETED | OUTPATIENT
Start: 2022-03-19 | End: 2022-03-19

## 2022-03-19 RX ORDER — CYCLOBENZAPRINE HCL 5 MG
5 TABLET ORAL 3 TIMES DAILY
Qty: 12 TABLET | Refills: 0 | Status: SHIPPED | OUTPATIENT
Start: 2022-03-19 | End: 2022-03-23

## 2022-03-19 RX ADMIN — KETOROLAC TROMETHAMINE 15 MG: 15 INJECTION, SOLUTION INTRAMUSCULAR; INTRAVENOUS at 11:13

## 2022-03-19 NOTE — ED PROVIDER NOTES
[facility]  [address] [facility] EMERGENCY DEPT      Date: 3/19/2022  Patient Name: Carmina Bowen    History of Presenting Illness     Chief Complaint   Patient presents with    Back Pain     55 y.o. male with a past medical history of insulin-dependent diabetes and hypertension presents the ED complaining of low back pain over the past several weeks. Patient states he has suffered from this in the past.  He describes having constant moderate aching pain, which is worse in the morning and then improves throughout the day. He states that he has radiation of pain down his bilateral posterior legs intermittently. Patient denies any fever, chills, numbness or weakness, bowel or bladder function loss, abdominal pain, other symptoms. Patient denies any other associated signs or symptoms. Patient denies any other complaints. Nursing notes regarding the HPI and triage nursing notes were reviewed. Prior medical records were reviewed. Current Outpatient Medications   Medication Sig Dispense Refill    cyclobenzaprine (FLEXERIL) 5 mg tablet Take 1 Tablet by mouth three (3) times daily for 4 days. 12 Tablet 0    ketorolac (TORADOL) 10 mg tablet Take 1 Tablet by mouth every six (6) hours as needed for Pain for up to 10 doses. 10 Tablet 0    Pain Reliever, acetaminophen, 500 mg tablet TAKE 1 TABLET BY MOUTH EVERY 4 HOURS AS NEEDED FOR PAIN      tadalafiL (CIALIS) 20 mg tablet Take 20 mg by mouth daily.  methocarbamoL (ROBAXIN) 500 mg tablet Take 500 mg by mouth four (4) times daily. (Patient not taking: Reported on 2/18/2022)      lidocaine (LIDODERM) 5 % APPLY ONE PATCH TOPICALLY TO CLEAN, DRY SKIN. LEAVE ON FOR 12 HOURS THEN REMOVE. MUST WAIT AT LEAST 12 HOURS BEFORE APPLYING PATCH(ES) AGAIN.       Trulicity 6.02 HM/8.1 mL sub-q pen INJECT 0.5ML BENEATH THE SKIN EVERY 7 DAYS      predniSONE (STERAPRED DS) 10 mg dose pack See administration instruction per 10mg dose pack (Patient not taking: Reported on 2/18/2022) 21 Tablet 0    gabapentin (NEURONTIN) 300 mg capsule Take 1 Capsule by mouth three (3) times daily. Max Daily Amount: 900 mg. (Patient not taking: Reported on 2/18/2022) 90 Capsule 2    tamsulosin (FLOMAX) 0.4 mg capsule Take 1 capsule by mouth twice daily 60 Cap 0    sildenafil citrate (VIAGRA) 100 mg tablet TAKE ONE TABLET BY MOUTH ONE HOUR PRIOR TO SEXUAL ACTIVITY 30 Tab 0    hydroCHLOROthiazide (HYDRODIURIL) 25 mg tablet Take 25 mg by mouth daily.  glyBURIDE (DIABETA) 5 mg tablet Take  by mouth Daily (before breakfast).  NIFEdipine ER (PROCARDIA XL) 30 mg ER tablet Take  by mouth daily.  dutasteride (AVODART) 0.5 mg capsule Take 1 Cap by mouth daily (after dinner). 90 Cap 3    pravastatin (PRAVACHOL) 40 mg tablet TAKE 1 TABLET BY MOUTH ONCE DAILY  0    aspirin delayed-release 81 mg tablet TAKE 1 TABLET BY MOUTH ONCE DAILY  11    metFORMIN (GLUCOPHAGE) 500 mg tablet Take  by mouth two (2) times daily (with meals).  insulin NPH/insulin regular (HUMULIN 70/30 U-100 INSULIN) 100 unit/mL (70-30) injection by SubCUTAneous route.  losartan (COZAAR) 100 mg tablet Take 100 mg by mouth daily.  amLODIPine (NORVASC) 5 mg tablet Take 5 mg by mouth daily. Past History     Past Medical History:  Past Medical History:   Diagnosis Date    BPH with obstruction/lower urinary tract symptoms     Diabetes ([address]     Diabetes mellitus     Difficulty voiding     Elevated PSA     Erectile dysfunction     Essential hypertension     Gross hematuria     Hematospermia     Hx of prostate biopsy     benign     Hypertension        Past Surgical History:  No past surgical history on file.     Family History:  Family History   Problem Relation Age of Onset    Hypertension Mother     Diabetes Father     Diabetes Mother     Hypertension Father     Diabetes Brother     Hypertension Brother        Social History:  Social History     Tobacco Use    Smoking status: Never Smoker  Smokeless tobacco: Never Used   Substance Use Topics    Alcohol use: Yes    Drug use: Not on file       Allergies:  No Known Allergies    Patient's primary care provider (as noted in EPIC):  Ely Lagunas MD    Review of Systems   Constitutional:  Denies malaise, fever, chills. Head:  Denies injury. Cardiac:  Denies chest pain or palpitations. Respiratory:  Denies cough, wheezing, difficulty breathing, shortness of breath. GI/ABD:  Denies injury, pain, distention, nausea, vomiting, diarrhea. :  Denies injury, pain, dysuria or urgency. Back: + back pain with radiation down posterior bilateral legs. Pelvis:  Denies injury or pain. Extremity/MS:  Denies injury or pain. Neuro:  Denies headache, LOC, dizziness, neurologic symptoms/deficits/paresthesias. Skin: Denies injury, rash, itching or skin changes. All other systems negative as reviewed. Visit Vitals  BP (!) 168/98   Pulse 82   Temp 98.2 °F (36.8 °C)   Resp 16   Ht 6' 1\" (1.854 m)   Wt 109.3 kg (241 lb)   SpO2 99%   BMI 31.80 kg/m²       PHYSICAL EXAM:    CONSTITUTIONAL:  Alert, in no apparent distress;  well developed;  well nourished. HEAD:  Normocephalic, atraumatic. EYES:  EOMI. Non-icteric sclera. Normal conjunctiva. ENTM:  Nose:  no rhinorrhea. Throat:  no erythema or exudate, mucous membranes moist.  NECK:  Supple  RESPIRATORY:  Chest clear, equal breath sounds, good air movement. CARDIOVASCULAR:  Regular rate and rhythm. No murmurs, rubs, or gallops. GI:  Normal bowel sounds, abdomen soft and non-tender. No rebound or guarding. BACK:  Reproducible midline tenderness to palpation. No vertebral bony point tenderness or step off. No rash, lesions, bruising. Normal perianal sensation. Positive bilateral straight leg raise. UPPER EXT:  Normal inspection. LOWER EXT:  No edema, no calf tenderness. Distal pulses intact.   NEURO:  Moves all four extremities, and grossly normal motor exam.  SKIN:  No rashes;  Normal for age. PSYCH:  Alert and normal affect. ED COURSE:      XR SPINE LUMB 2 OR 3 V    Result Date: 3/19/2022  Spine Lumbar Limited Three views of the lumbar spine are reviewed. INDICATION: Back pain. COMPARISON: No prior. FINDINGS: Negative for fracture or subluxation. No osteolytic or osteoblastic lesions are apparent. The disc spaces are well preserved. No significant degenerative changes are present. Early syndesmophyte development is present . Bone mineralization seems normal. Deformity of the sacrum suggests old injury     Old sacral injury. Early syndesmophyte formation Negative otherwise; No acute findings     No acute process noted on imaging. No sign of cauda equina. Vitals look well aside from slight elevation of blood pressure, however patient is in pain. Given Toradol here. Rx for Toradol and Flexeril at home. Patient will also use lidocaine patches and follow-up with spinal specialist.    Diagnosis:   1. Acute midline low back pain with sciatica, sciatica laterality unspecified      Disposition: Discharge    Follow-up Information     Follow up With Specialties Details Why Contact Info    Marcos Dickey MD Orthopedic Surgery Schedule an appointment as soon as possible for a visit   [address] [facility] [address] [facility] EMERGENCY DEPT Emergency Medicine  If symptoms worsen [address] [address]  [phone]          Discharge Medication List as of 3/19/2022 11:19 AM      START taking these medications    Details   ketorolac (TORADOL) 10 mg tablet Take 1 Tablet by mouth every six (6) hours as needed for Pain for up to 10 doses. , Normal, Disp-10 Tablet, R-0         CONTINUE these medications which have CHANGED    Details   cyclobenzaprine (FLEXERIL) 5 mg tablet Take 1 Tablet by mouth three (3) times daily for 4 days. , Normal, Disp-12 Tablet, R-0         CONTINUE these medications which have NOT CHANGED    Details   Pain Reliever, acetaminophen, 500 mg tablet TAKE 1 TABLET BY MOUTH EVERY 4 HOURS AS NEEDED FOR PAIN, Historical Med, AVELINO      tadalafiL (CIALIS) 20 mg tablet Take 20 mg by mouth daily. , Historical Med      methocarbamoL (ROBAXIN) 500 mg tablet Take 500 mg by mouth four (4) times daily. , Historical Med      lidocaine (LIDODERM) 5 % APPLY ONE PATCH TOPICALLY TO CLEAN, DRY SKIN. LEAVE ON FOR 12 HOURS THEN REMOVE. MUST WAIT AT LEAST 12 HOURS BEFORE APPLYING PATCH(ES) AGAIN., Historical Med      Trulicity 1.79 LD/1.2 mL sub-q pen INJECT 0.5ML BENEATH THE SKIN EVERY 7 DAYS, Historical Med, AVELINO      predniSONE (STERAPRED DS) 10 mg dose pack See administration instruction per 10mg dose pack, Normal, Disp-21 Tablet, R-0      gabapentin (NEURONTIN) 300 mg capsule Take 1 Capsule by mouth three (3) times daily. Max Daily Amount: 900 mg., Normal, Disp-90 Capsule, R-2      tamsulosin (FLOMAX) 0.4 mg capsule Take 1 capsule by mouth twice daily, Normal, Disp-60 Cap, R-0      sildenafil citrate (VIAGRA) 100 mg tablet TAKE ONE TABLET BY MOUTH ONE HOUR PRIOR TO SEXUAL ACTIVITY, Normal, Disp-30 Tab,R-0      hydroCHLOROthiazide (HYDRODIURIL) 25 mg tablet Take 25 mg by mouth daily. , Historical Med      glyBURIDE (DIABETA) 5 mg tablet Take  by mouth Daily (before breakfast). , Historical Med      NIFEdipine ER (PROCARDIA XL) 30 mg ER tablet Take  by mouth daily. , Historical Med      dutasteride (AVODART) 0.5 mg capsule Take 1 Cap by mouth daily (after dinner). , Normal, Disp-90 Cap, R-3, AVELINO      pravastatin (PRAVACHOL) 40 mg tablet TAKE 1 TABLET BY MOUTH ONCE DAILY, Historical Med, R-0      aspirin delayed-release 81 mg tablet TAKE 1 TABLET BY MOUTH ONCE DAILY, Historical Med, R-11      metFORMIN (GLUCOPHAGE) 500 mg tablet Take  by mouth two (2) times daily (with meals). , Historical Med      insulin NPH/insulin regular (HUMULIN 70/30 U-100 INSULIN) 100 unit/mL (70-30) injection by SubCUTAneous route., Historical Med      losartan (COZAAR) 100 mg tablet Take 100 mg by mouth daily. , Historical Med      amLODIPine (NORVASC) 5 mg tablet Take 5 mg by mouth daily. Historical Med, 5 mg           Joshua Pires

## 2022-03-19 NOTE — ED TRIAGE NOTES
Pt complaining of lower back pain x 2 weeks. Worse in the morning, states once he gets moving it gets better. Pain shoots down the back of both legs.

## 2024-05-02 ENCOUNTER — OFFICE VISIT (OUTPATIENT)
Age: 59
End: 2024-05-02
Payer: MEDICAID

## 2024-05-02 VITALS — BODY MASS INDEX: 26.68 KG/M2 | HEIGHT: 77 IN

## 2024-05-02 DIAGNOSIS — M65.341 TRIGGER RING FINGER OF RIGHT HAND: Primary | ICD-10-CM

## 2024-05-02 PROCEDURE — 99213 OFFICE O/P EST LOW 20 MIN: CPT | Performed by: ORTHOPAEDIC SURGERY

## 2024-05-02 PROCEDURE — 20550 NJX 1 TENDON SHEATH/LIGAMENT: CPT | Performed by: ORTHOPAEDIC SURGERY

## 2024-05-02 RX ORDER — LIDOCAINE HYDROCHLORIDE 10 MG/ML
0.5 INJECTION, SOLUTION INFILTRATION; PERINEURAL ONCE
Status: COMPLETED | OUTPATIENT
Start: 2024-05-02 | End: 2024-05-02

## 2024-05-02 RX ADMIN — LIDOCAINE HYDROCHLORIDE 0.5 ML: 10 INJECTION, SOLUTION INFILTRATION; PERINEURAL at 15:39

## 2024-05-02 NOTE — PROGRESS NOTES
Vern Reyes is a 58 y.o. male right handed.  Worker's Compensation and legal considerations: none    Chief Complaint   Patient presents with    Hand Pain     Right hand pain     Pain Score:   6    HPI: Patient presents today with complaints of right ring finger pain and locking.    Initial (2021) HPI: Patient presents today with complaints of right hand numbness and tingling. He has recently had carpal tunnel syndrome and cubital tunnel syndrome diagnosed by EMG.     Date of onset: Indeterminate  Injury: No  Prior Treatment:  Yes: Comment: Right carpal tunnel injection 2021    ROS: Review of Systems - General ROS: negative except HPI    Past Medical History:   Diagnosis Date    BPH with obstruction/lower urinary tract symptoms     Diabetes (HCC)     Diabetes mellitus (HCC)     Difficulty voiding     Elevated PSA     Erectile dysfunction     Essential hypertension     Gross hematuria     Hematospermia     Hx of prostate biopsy     benign     Hypertension        History reviewed. No pertinent surgical history.     Current Outpatient Medications   Medication Sig Dispense Refill    amLODIPine (NORVASC) 10 MG tablet Take 1 tablet by mouth daily      baclofen (LIORESAL) 10 MG tablet 1 to 2 tabs po qhs prn muscle spasm. Caution sedation.      Continuous Blood Gluc Sensor (FREESTYLE CHIP 14 DAY SENSOR) MISC APPLY 1 SENSOR TOPICALLY EVERY 14 DAYS      sulindac (CLINORIL) 200 MG tablet 1 tab po bid prn pain. Take w food.      finasteride (PROSCAR) 5 MG tablet Take 1 tablet by mouth daily 30 tablet 3    acetaminophen (TYLENOL) 500 MG tablet TAKE 1 TABLET BY MOUTH EVERY 4 HOURS AS NEEDED FOR PAIN      amLODIPine (NORVASC) 5 MG tablet Take 1 tablet by mouth daily      aspirin 81 MG EC tablet TAKE 1 TABLET BY MOUTH ONCE DAILY      Dulaglutide (TRULICITY) 0.75 MG/0.5ML SOPN INJECT 0.5ML BENEATH THE SKIN EVERY 7 DAYS      dutasteride (AVODART) 0.5 MG capsule Take 1 capsule by mouth      gabapentin (NEURONTIN) 300 MG capsule Take 1

## 2024-08-08 ENCOUNTER — OFFICE VISIT (OUTPATIENT)
Age: 59
End: 2024-08-08

## 2024-08-08 VITALS — WEIGHT: 225 LBS | BODY MASS INDEX: 26.57 KG/M2 | HEIGHT: 77 IN

## 2024-08-08 DIAGNOSIS — M65.341 TRIGGER RING FINGER OF RIGHT HAND: Primary | ICD-10-CM

## 2024-08-08 RX ORDER — LIDOCAINE HYDROCHLORIDE 10 MG/ML
0.5 INJECTION, SOLUTION INFILTRATION; PERINEURAL ONCE
Status: COMPLETED | OUTPATIENT
Start: 2024-08-08 | End: 2024-08-08

## 2024-08-08 RX ADMIN — LIDOCAINE HYDROCHLORIDE 0.5 ML: 10 INJECTION, SOLUTION INFILTRATION; PERINEURAL at 16:14

## 2024-08-08 NOTE — PROGRESS NOTES
Vern Reyes is a 58 y.o. male right handed.  Worker's Compensation and legal considerations: none    Chief Complaint   Patient presents with    Follow-up     Right ring,middle finger trigger injection      Pain Score:   8    8/8/2024 HPI: Patient returns today due to right ring finger pain and locking recurrence.  Patient also reports that this is affecting the middle finger as well.    5/2/2024 HPI: Patient presents today with complaints of right ring finger pain and locking.    Initial (2021) HPI: Patient presents today with complaints of right hand numbness and tingling. He has recently had carpal tunnel syndrome and cubital tunnel syndrome diagnosed by EMG.     Date of onset: Indeterminate  Injury: No  Prior Treatment:  Yes: Comment: Right carpal tunnel injection 2021    ROS: Review of Systems - General ROS: negative except HPI    Past Medical History:   Diagnosis Date    BPH with obstruction/lower urinary tract symptoms     Diabetes (HCC)     Diabetes mellitus (HCC)     Difficulty voiding     Elevated PSA     Erectile dysfunction     Essential hypertension     Gross hematuria     Hematospermia     Hx of prostate biopsy     benign     Hypertension        History reviewed. No pertinent surgical history.     Current Outpatient Medications   Medication Sig Dispense Refill    amLODIPine (NORVASC) 10 MG tablet Take 1 tablet by mouth daily      baclofen (LIORESAL) 10 MG tablet 1 to 2 tabs po qhs prn muscle spasm. Caution sedation.      Continuous Blood Gluc Sensor (FREESTYLE CHIP 14 DAY SENSOR) MISC APPLY 1 SENSOR TOPICALLY EVERY 14 DAYS      sulindac (CLINORIL) 200 MG tablet 1 tab po bid prn pain. Take w food.      finasteride (PROSCAR) 5 MG tablet Take 1 tablet by mouth daily 30 tablet 3    acetaminophen (TYLENOL) 500 MG tablet TAKE 1 TABLET BY MOUTH EVERY 4 HOURS AS NEEDED FOR PAIN      amLODIPine (NORVASC) 5 MG tablet Take 1 tablet by mouth daily      aspirin 81 MG EC tablet TAKE 1 TABLET BY MOUTH ONCE DAILY

## 2025-01-02 ENCOUNTER — OFFICE VISIT (OUTPATIENT)
Age: 60
End: 2025-01-02

## 2025-01-02 VITALS — HEIGHT: 73 IN | WEIGHT: 225 LBS | BODY MASS INDEX: 29.82 KG/M2

## 2025-01-02 DIAGNOSIS — M65.331 TRIGGER MIDDLE FINGER OF RIGHT HAND: ICD-10-CM

## 2025-01-02 DIAGNOSIS — M65.341 TRIGGER RING FINGER OF RIGHT HAND: Primary | ICD-10-CM

## 2025-01-02 RX ORDER — LIDOCAINE HYDROCHLORIDE 10 MG/ML
1 INJECTION, SOLUTION INFILTRATION; PERINEURAL ONCE
Status: COMPLETED | OUTPATIENT
Start: 2025-01-02 | End: 2025-01-02

## 2025-01-02 RX ORDER — IBUPROFEN 800 MG/1
400 TABLET, FILM COATED ORAL EVERY 6 HOURS PRN
COMMUNITY
Start: 2024-09-26

## 2025-01-02 RX ADMIN — LIDOCAINE HYDROCHLORIDE 1 ML: 10 INJECTION, SOLUTION INFILTRATION; PERINEURAL at 16:25

## 2025-01-02 NOTE — PROGRESS NOTES
finger of right hand              Plan:     Right ring and middle trigger finger injections    Discussed possibility of surgery in the future if he has recurrence again.    At least 10 minutes was spent in chart review, review of any pertinent diagnostic testing, clinical examination, discussion of treatment options, performing any indicated procedures, and documentation.  We discussed the need for surgery in the future if the trigger finger returns a third time.      Return if symptoms worsen or fail to improve.     Plan was reviewed with patient, who verbalized agreement and understanding of the plan    [place] [facility] [facility]  [address]  [facility] [address]   OFFICE PROCEDURE PROGRESS NOTE        Chart reviewed for the following:   Abimael VALERA DO, have reviewed the History, Physical and updated the Allergic reactions for [facility]     TIME OUT performed immediately prior to start of procedure:   Abimael VALERA DO, have performed the following reviews on [facility] prior to the start of the procedure:            * Patient was identified by name and date of birth   * Agreement on procedure being performed was verified  * Risks and Benefits explained to the patient  * Procedure site verified and marked as necessary  * Patient was positioned for comfort  * Consent was signed and verified         Procedure performed by:  Abimael Murray DO    Patient assisted by: self    How tolerated by patient: tolerated    Post Procedural Pain Scale:0    Comments: none    Procedure:  After consent was obtained, using sterile technique the right ring finger and right middle finger was prepped. Local anesthetic used: 1% Lidocaine Kenalog 5 mgx2  and was then injected and the needle withdrawn.  The procedure was well tolerated.  The patient is asked to continue to rest the area for a few more days before resuming regular activities.  It may be more

## 2025-01-14 ENCOUNTER — OFFICE VISIT (OUTPATIENT)
Age: 60
End: 2025-01-14
Payer: MEDICAID

## 2025-01-14 VITALS — WEIGHT: 225 LBS | BODY MASS INDEX: 29.82 KG/M2 | HEIGHT: 73 IN

## 2025-01-14 DIAGNOSIS — M65.331 TRIGGER MIDDLE FINGER OF RIGHT HAND: ICD-10-CM

## 2025-01-14 DIAGNOSIS — M65.341 TRIGGER RING FINGER OF RIGHT HAND: Primary | ICD-10-CM

## 2025-01-14 PROCEDURE — 99213 OFFICE O/P EST LOW 20 MIN: CPT

## 2025-01-14 NOTE — PROGRESS NOTES
Vern Reyes is a 59 y.o. male right handed.  Worker's Compensation and legal considerations: none    Chief Complaint   Patient presents with    Hand Pain     Right hand     Pain Score:   7    1/14/2025 HPI: Patient presents today due to persistent right ring and middle finger pain and locking.  Reports injection he received 12 days ago has not improved his symptoms at all.    Of note, he reports history of trigger finger release which he believes was on the left hand.    1/2/2025 HPI: Patient presents today due to recurrence of right ring finger pain and locking as well as worsening pain and symptoms in the middle finger.    8/8/2024 HPI: Patient returns today due to right ring finger pain and locking recurrence.  Patient also reports that this is affecting the middle finger as well.    Initial (2021) HPI: Patient presents today with complaints of right hand numbness and tingling. He has recently had carpal tunnel syndrome and cubital tunnel syndrome diagnosed by EMG.     Date of onset: Indeterminate  Injury: No  Prior Treatment:  Yes: Comment: Right carpal tunnel injection 2021,     ROS: Review of Systems - General ROS: negative except HPI    Past Medical History:   Diagnosis Date    BPH with obstruction/lower urinary tract symptoms     Diabetes (HCC)     Diabetes mellitus (HCC)     Difficulty voiding     Elevated PSA     Erectile dysfunction     Essential hypertension     Gross hematuria     Hematospermia     Hx of prostate biopsy     benign     Hypertension        History reviewed. No pertinent surgical history.     Current Outpatient Medications   Medication Sig Dispense Refill    ibuprofen (IBU) 800 MG tablet Take 0.5 tablets by mouth every 6 hours as needed      finasteride (PROSCAR) 5 MG tablet Take 1 tablet by mouth daily 90 tablet 3    amLODIPine (NORVASC) 10 MG tablet Take 1 tablet by mouth daily      baclofen (LIORESAL) 10 MG tablet 1 to 2 tabs po qhs prn muscle spasm. Caution sedation.      Continuous

## 2025-06-24 ENCOUNTER — HOSPITAL ENCOUNTER (OUTPATIENT)
Facility: HOSPITAL | Age: 60
Setting detail: RECURRING SERIES
Discharge: HOME | End: 2025-06-27
Payer: MEDICAID

## 2025-06-24 PROCEDURE — 97161 PT EVAL LOW COMPLEX 20 MIN: CPT

## 2025-06-24 NOTE — PROGRESS NOTES
PF Daily Treatment Note    Patient Name: Vern Reyes    Date: 2025    : 1965  Insurance: Payor: [facility] MEDICAID / Plan: [facility] COMMUN[employer] PLAN CARDINAL CARE / Product Type: *No Product type* /      Patient  verified yes     Visit #   Current / Total 1 16-24   Time   In / Out 3:22 4:00   Pain   In / Out 0/10 0/10   Subjective Functional Status/Changes: See below       Treatment Area: Enlarged prostate with urinary obstruction  Benign prostatic hyperplasia with lower urinary tract symptoms, symptom details unspecified    SUBJECTIVE  Pain Level (0-10 scale): 0/10  []constant []intermittent []improving []worsening []no change since onset    Any medication changes, allergies to medications, adverse drug reactions, diagnosis change, or new procedure performed?: [x] No    [] Yes (see summary sheet for update)  Subjective functional status/changes:     PLOF: ind with all mobility, urinary retention but no leakage, prolonged driving ()  Limitations to PLOF: diabetes  Mechanism of Injury:   Current symptoms/Complaints: Mr. Eric Porras is a 58 y/o, M who present with c/o mixed UI.  Pt is 3 month s/p Heloc surgery due to enlarged prostate. He denies any pain or bowel problem. Urinary leakage occurs mostly with moderate activities almost every day. Leakage is moderate & pt usually needs 1-2x pads a day. He used to have urinary retention problem before the surgery but that deemed to resolve after the surgery.     Previous Treatment/Compliance:   PMHx/Surgical Hx:   Work Hx:   Living Situation:   Pt Goals:   Barriers: []pain []financial []time []transportation []other  Motivation:   Substance use: []Alcohol []Tobacco []other:   FABQ Score: []low []elevate  Cognition: A & O x     Other:    OBJECTIVE/EXAMINATION  Domestic Life:   Activity/Recreational Limitations:   Mobility:   Self Care:   31 min [x]Eval                  []Re-Eval     Therapeutic Procedures:  Tx Min Billable or 1:1 Min (if diff from Tx

## 2025-06-24 NOTE — THERAPY EVALUATION
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  Assessment/ santamaria information: Mr. Eric Porras is a 60 y/o, M who present with c/o mixed UI.  Pt is 3 month s/p Heloc surgery due to enlarged prostate. He denies any pain or bowel problem. Urinary leakage occurs mostly with moderate activities almost every day. Leakage is moderate & pt usually needs 1-2x pads a day. He used to have urinary retention problem before the surgery but that deemed to resolve after the surgery. Pt demonstrates poor awareness of PFM, decreased core strength & endurance. He requested to hold internal assessment/manual.  Patient may benefit from physical therapy to address these limitations to improve his QOL.       Evaluation Complexity:  History:  HIGH Complexity :3+ comorbidities / personal factors will impact the outcome/ POC ; Examination:  LOW Complexity : 1-2 Standardized tests and measures addressing body structure, function, activity limitation and / or participation in

## 2025-07-14 ENCOUNTER — HOSPITAL ENCOUNTER (OUTPATIENT)
Facility: HOSPITAL | Age: 60
Setting detail: RECURRING SERIES
End: 2025-07-14
Payer: MEDICAID

## 2025-07-21 ENCOUNTER — HOSPITAL ENCOUNTER (OUTPATIENT)
Facility: HOSPITAL | Age: 60
Setting detail: RECURRING SERIES
Discharge: HOME | End: 2025-07-24
Payer: MEDICAID

## 2025-07-21 ENCOUNTER — OFFICE VISIT (OUTPATIENT)
Age: 60
End: 2025-07-21

## 2025-07-21 VITALS — BODY MASS INDEX: 29 KG/M2 | HEIGHT: 74 IN | WEIGHT: 226 LBS

## 2025-07-21 DIAGNOSIS — M65.312 TRIGGER THUMB OF LEFT HAND: Primary | ICD-10-CM

## 2025-07-21 PROCEDURE — 97112 NEUROMUSCULAR REEDUCATION: CPT

## 2025-07-21 PROCEDURE — 97530 THERAPEUTIC ACTIVITIES: CPT

## 2025-07-21 PROCEDURE — 97110 THERAPEUTIC EXERCISES: CPT

## 2025-07-21 PROCEDURE — 97535 SELF CARE MNGMENT TRAINING: CPT

## 2025-07-21 RX ORDER — LIDOCAINE HYDROCHLORIDE 10 MG/ML
0.5 INJECTION, SOLUTION INFILTRATION; PERINEURAL ONCE
Status: COMPLETED | OUTPATIENT
Start: 2025-07-21 | End: 2025-07-21

## 2025-07-21 RX ADMIN — LIDOCAINE HYDROCHLORIDE 0.5 ML: 10 INJECTION, SOLUTION INFILTRATION; PERINEURAL at 15:33

## 2025-07-21 NOTE — PROGRESS NOTES
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.46  MIXED INCONTINENCE      Onset Date: 3 months ago Payor :  Payor: [facility] MEDICAID / Plan: [facility] CARDINAL C[facility] / Product Type: *No Product type* /    Referral Source: Jose Gavin APRN* Start of Care (SOC): 2025   Prior Hospitalization: See medical history Provider #: [id number]   Prior Level of Function: ind with all mobility, urinary retention but no leakage, prolonged driving ()    Comorbidities: Other: surgery for benign prostatic hyperplasia, diabetes                                      Visits from Start of Care: 1    Missed Visits: 1    Established Goals:             Excellent   Good           Limited             None  [x] Increase Pelvic MM strength        []      []  [x]  []  [] Decrease Pelvic MM hypertonus      []      []  []  []  [x] Decrease Incontinence Episodes     []      []  []  []   [x] Improve Voiding Habits         []      []  []  [x]  [x] Decreased Urgency         []      []  []  [x]    Key Functional Changes: improving awareness of PFM, optimal bladder & bowel function    Current progress towards goals:  Short Term Goals: To be accomplished in 4-6 weeks:  1. Patient will demonstrate accurate performance of home exercise program/pelvic floor contractions as adjunct to physical therapy clinic visits to promote healthy lifestyle and improved quality of life.  Status at evaluation: initiated HEP  Current: partial compliance 25     2. Patient will decrease nocturia to 1x/night max to improve ease with sleep & ADLs/job duties next day.  Status at evaluation: 3x/night  Current:

## 2025-07-21 NOTE — PROGRESS NOTES
PF DAILY TREATMENT NOTE       Patient Name: Vern Reyes    Date: 2025    : 1965  Insurance: Payor: [facility] MEDICAID / Plan: [facility] [employer] CARDINAL CARE / Product Type: *No Product type* /      Patient  verified yes     Visit #   Current / Total 2 16-24   Time   In / Out 4:41 5:21   Pain   In / Out 0/10 0/10   Subjective Functional Status/Changes: Pt reports trying his best with PFM exercises. He still wakes up 2-3x/night, about 3-4 nights a week. Bowel is WNL. Water intake is bout 3-4 cups of 8 oz with some coffee. No change with leakage, mostly occurs with walking throughout the day     TREATMENT AREA =  Enlarged prostate with urinary obstruction  Benign prostatic hyperplasia with lower urinary tract symptoms, symptom details unspecified      OBJECTIVE      Therapeutic Procedures:  Tx Min Billable or 1:1 Min (if diff from Tx Min) Procedure, Rationale, Specifics   With TA  Bladder Training:  [x] voiding schedule          [] program progression  [] decrease every  minutes/hours     Rationale: Increase pelvic floor muscle strength, Improve quality of pelvic floor contractions, Decrease resting tone of the pelvic floor, Increase tissue extensibility of the pelvic floor muscles, Increase core strength, Inhibit abnormal muscle activity, and Improve lumbosacral and coccygeal mobility in order to Increase urinary continence, Decrease urinary urgency, Increase ability to delay urination, Decrease frequency of urination, Decrease nocturia, and Improve ability to perform ADLs.      Patient Education:  [] positioning   [] body mechanics   [] transfers   [] heat/ice application      12  [id number] Therapeutic Activity (timed):  use of dynamic activities replicating functional movements to increase ROM, strength, coordination, balance, and proprioception in order to improve patient's ability to progress to PLOF and address remaining functional goals.  (see flow sheet as applicable)     Details if applicable:

## 2025-07-21 NOTE — PROGRESS NOTES
examination. These findings may be consistent with:   1. Moderate median mononeuropathy at the right wrist (carpal tunnel syndrome)   2. 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  CLINICAL INTERPRETATION  His electrodiagnostic findings of median and ulnar mononeuropathy at the right wrist may be consistent with his right hand symptoms. The carpal tunnel is the most likely symptomatic given the distribution of his symptoms.       Imaging:     None indicated      Impression     Diagnosis Orders   1. Trigger thumb of left hand  INJECT TENDON SHEATH/LIGAMENT    lidocaine 1 % injection 0.5 mL    triamcinolone acetonide (KENALOG) injection 5 mg              Plan:     Left trigger thumb injection.    We did discuss surgery as an option for his trigger thumb however given that this is the first occurrence and he has had good success with injections treated in the acute setting previously on other digits, we will defer for now and try a steroid injection.  We also discussed that if the this trigger finger is less responsive or recurrent more than once we will have to proceed with surgical intervention.    Return if symptoms worsen or fail to improve.     Plan was reviewed with patient, who verbalized agreement and understanding of the plan    [facility]  [address]  [place] [id number]   OFFICE PROCEDURE PROGRESS NOTE        Chart reviewed for the following:   Abimael VALERA DO, have reviewed the History, Physical and updated the Allergic reactions for [facility] Reyes     TIME OUT performed immediately prior to start of procedure:   Abimael VALERA DO, have performed the following reviews on [facility] Reyes prior to the start of the procedure:            * Patient was identified by name and

## 2025-07-29 ENCOUNTER — TELEPHONE (OUTPATIENT)
Facility: HOSPITAL | Age: 60
End: 2025-07-29

## 2025-07-29 NOTE — TELEPHONE ENCOUNTER
Pt forgot to call to cancel. He was called into work suddenly. He also needs to reschedule his Aug due to current work load but he will call to do that later.

## 2025-08-05 ENCOUNTER — TELEPHONE (OUTPATIENT)
Facility: HOSPITAL | Age: 60
End: 2025-08-05